# Patient Record
Sex: MALE | Race: WHITE | NOT HISPANIC OR LATINO | Employment: OTHER | URBAN - METROPOLITAN AREA
[De-identification: names, ages, dates, MRNs, and addresses within clinical notes are randomized per-mention and may not be internally consistent; named-entity substitution may affect disease eponyms.]

---

## 2017-11-14 ENCOUNTER — OFFICE VISIT (OUTPATIENT)
Dept: PRIMARY CARE CLINIC | Facility: CLINIC | Age: 57
End: 2017-11-14
Payer: COMMERCIAL

## 2017-11-14 DIAGNOSIS — N20.0 NEPHROLITHIASIS: ICD-10-CM

## 2017-11-14 DIAGNOSIS — F41.9 ANXIETY: ICD-10-CM

## 2017-11-14 DIAGNOSIS — E66.3 OVERWEIGHT: ICD-10-CM

## 2017-11-14 DIAGNOSIS — F43.10 POSTTRAUMATIC STRESS DISORDER: ICD-10-CM

## 2017-11-14 DIAGNOSIS — I10 HYPERTENSION, UNSPECIFIED TYPE: Primary | ICD-10-CM

## 2017-11-14 DIAGNOSIS — Z87.81 HISTORY OF RIB FRACTURE: ICD-10-CM

## 2017-11-14 DIAGNOSIS — E11.00 TYPE 2 DIABETES MELLITUS WITH HYPEROSMOLARITY WITHOUT COMA, WITHOUT LONG-TERM CURRENT USE OF INSULIN: ICD-10-CM

## 2017-11-14 DIAGNOSIS — F32.A DEPRESSION, UNSPECIFIED DEPRESSION TYPE: ICD-10-CM

## 2017-11-14 PROCEDURE — 99999 PR PBB SHADOW E&M-NEW PATIENT-LVL III: CPT | Mod: PBBFAC,,, | Performed by: FAMILY MEDICINE

## 2017-11-14 PROCEDURE — 99203 OFFICE O/P NEW LOW 30 MIN: CPT | Mod: S$GLB,,, | Performed by: FAMILY MEDICINE

## 2017-11-14 RX ORDER — LORAZEPAM 1 MG/1
1 TABLET ORAL 3 TIMES DAILY
COMMUNITY
Start: 2017-11-02 | End: 2022-08-30

## 2017-11-14 RX ORDER — IBUPROFEN 100 MG/5ML
2000 SUSPENSION, ORAL (FINAL DOSE FORM) ORAL DAILY
COMMUNITY

## 2017-11-14 RX ORDER — MULTIVITAMIN
1 TABLET ORAL DAILY
COMMUNITY

## 2017-11-14 RX ORDER — TRAZODONE HYDROCHLORIDE 150 MG/1
150 TABLET ORAL NIGHTLY
COMMUNITY
Start: 2017-09-18 | End: 2020-05-28 | Stop reason: SDUPTHER

## 2017-11-14 RX ORDER — AMLODIPINE BESYLATE 5 MG/1
5 TABLET ORAL DAILY
Qty: 30 TABLET | Refills: 11 | Status: SHIPPED | OUTPATIENT
Start: 2017-11-14 | End: 2017-11-22 | Stop reason: SDUPTHER

## 2017-11-14 RX ORDER — AMOXICILLIN 500 MG
500 CAPSULE ORAL DAILY
COMMUNITY
End: 2020-05-28

## 2017-11-14 RX ORDER — NAPROXEN SODIUM 220 MG
440 TABLET ORAL DAILY
COMMUNITY
End: 2020-05-28

## 2017-11-14 RX ORDER — ASPIRIN 81 MG/1
81 TABLET ORAL DAILY
COMMUNITY
End: 2021-07-20

## 2017-11-14 RX ORDER — FLUOXETINE HYDROCHLORIDE 40 MG/1
40 CAPSULE ORAL DAILY
COMMUNITY
End: 2020-05-28
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
## 2017-11-14 NOTE — PROGRESS NOTES
Urine litmus testing revealed glucose 1000. Patient returned to office, current fasting . Findings discussed with Dr Allen. Ordered: metformin 500mg by mouth twice daily, refer patient to [facility] for diabetic and diet education, add hgbA1C to pending labs. Discussed with patient.
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
## 2017-11-15 ENCOUNTER — TELEPHONE (OUTPATIENT)
Dept: PRIMARY CARE CLINIC | Facility: CLINIC | Age: 57
End: 2017-11-15

## 2017-11-15 DIAGNOSIS — Z71.89 DIABETES EDUCATION, ENCOUNTER FOR: Primary | ICD-10-CM

## 2017-11-15 NOTE — TELEPHONE ENCOUNTER
----- Message from Tung Bazan sent at 11/15/2017  9:34 AM CST -----  Contact: self  Patient want to know if you can call his diabetic medication in please send to [facility], any questions please call back at [phone] (home)       [facility]  [phone]

## 2017-11-15 NOTE — TELEPHONE ENCOUNTER
Left message for patient that this matter will be discussed when he is seen for dietary education on 11/16.

## 2017-11-15 NOTE — TELEPHONE ENCOUNTER
----- Message from Brielle Vargas sent at 11/15/2017  1:11 PM CST -----  Patient requesting order for diabetic testing supplies: Glucose Meter and Testing Strips/please call back at [phone] to confirm.

## 2017-11-16 ENCOUNTER — CLINICAL SUPPORT (OUTPATIENT)
Dept: PRIMARY CARE CLINIC | Facility: CLINIC | Age: 57
End: 2017-11-16
Payer: COMMERCIAL

## 2017-11-16 DIAGNOSIS — I10 HYPERTENSION, UNSPECIFIED TYPE: ICD-10-CM

## 2017-11-16 DIAGNOSIS — E11.00 TYPE 2 DIABETES MELLITUS WITH HYPEROSMOLARITY WITHOUT COMA, WITHOUT LONG-TERM CURRENT USE OF INSULIN: ICD-10-CM

## 2017-11-16 DIAGNOSIS — E66.3 OVERWEIGHT: ICD-10-CM

## 2017-11-16 LAB
ALBUMIN SERPL BCP-MCNC: 4.1 G/DL
ALP SERPL-CCNC: 120 U/L
ALT SERPL W/O P-5'-P-CCNC: 136 U/L
ANION GAP SERPL CALC-SCNC: 10 MMOL/L
AST SERPL-CCNC: 86 U/L
BASOPHILS # BLD AUTO: 0.03 K/UL
BASOPHILS NFR BLD: 0.4 %
BILIRUB SERPL-MCNC: 0.8 MG/DL
BUN SERPL-MCNC: 22 MG/DL
CALCIUM SERPL-MCNC: 9.7 MG/DL
CHLORIDE SERPL-SCNC: 103 MMOL/L
CHOLEST SERPL-MCNC: 208 MG/DL
CHOLEST/HDLC SERPL: 6.5 {RATIO}
CO2 SERPL-SCNC: 29 MMOL/L
COMPLEXED PSA SERPL-MCNC: 2.2 NG/ML
CREAT SERPL-MCNC: 1 MG/DL
DIFFERENTIAL METHOD: ABNORMAL
EOSINOPHIL # BLD AUTO: 0.1 K/UL
EOSINOPHIL NFR BLD: 1.4 %
ERYTHROCYTE [DISTWIDTH] IN BLOOD BY AUTOMATED COUNT: 12.4 %
EST. GFR  (AFRICAN AMERICAN): >60 ML/MIN/1.73 M^2
EST. GFR  (NON AFRICAN AMERICAN): >60 ML/MIN/1.73 M^2
ESTIMATED AVG GLUCOSE: 275 MG/DL
GLUCOSE SERPL-MCNC: 246 MG/DL
HBA1C MFR BLD HPLC: 11.2 %
HCT VFR BLD AUTO: 49.4 %
HDLC SERPL-MCNC: 32 MG/DL
HDLC SERPL: 15.4 %
HGB BLD-MCNC: 16.7 G/DL
IMM GRANULOCYTES # BLD AUTO: 0.03 K/UL
IMM GRANULOCYTES NFR BLD AUTO: 0.4 %
LDLC SERPL CALC-MCNC: 112.8 MG/DL
LYMPHOCYTES # BLD AUTO: 3.1 K/UL
LYMPHOCYTES NFR BLD: 36.9 %
MCH RBC QN AUTO: 31.7 PG
MCHC RBC AUTO-ENTMCNC: 33.8 G/DL
MCV RBC AUTO: 94 FL
MONOCYTES # BLD AUTO: 0.5 K/UL
MONOCYTES NFR BLD: 5.7 %
NEUTROPHILS # BLD AUTO: 4.6 K/UL
NEUTROPHILS NFR BLD: 55.2 %
NONHDLC SERPL-MCNC: 176 MG/DL
NRBC BLD-RTO: 0 /100 WBC
PLATELET # BLD AUTO: 250 K/UL
PMV BLD AUTO: 8.5 FL
POTASSIUM SERPL-SCNC: 4.6 MMOL/L
PROT SERPL-MCNC: 7.6 G/DL
RBC # BLD AUTO: 5.26 M/UL
SODIUM SERPL-SCNC: 142 MMOL/L
T4 FREE SERPL-MCNC: 1 NG/DL
TRIGL SERPL-MCNC: 316 MG/DL
TSH SERPL DL<=0.005 MIU/L-ACNC: 0.68 UIU/ML
WBC # BLD AUTO: 8.4 K/UL

## 2017-11-16 PROCEDURE — 80061 LIPID PANEL: CPT

## 2017-11-16 PROCEDURE — 83036 HEMOGLOBIN GLYCOSYLATED A1C: CPT

## 2017-11-16 PROCEDURE — 99999 PR PBB SHADOW E&M-EST. PATIENT-LVL II: CPT | Mod: PBBFAC,,,

## 2017-11-16 PROCEDURE — 84153 ASSAY OF PSA TOTAL: CPT

## 2017-11-16 PROCEDURE — 84439 ASSAY OF FREE THYROXINE: CPT

## 2017-11-16 PROCEDURE — 84443 ASSAY THYROID STIM HORMONE: CPT

## 2017-11-16 PROCEDURE — 80053 COMPREHEN METABOLIC PANEL: CPT

## 2017-11-16 PROCEDURE — 85025 COMPLETE CBC W/AUTO DIFF WBC: CPT

## 2017-11-17 ENCOUNTER — TELEPHONE (OUTPATIENT)
Dept: PRIMARY CARE CLINIC | Facility: CLINIC | Age: 57
End: 2017-11-17

## 2017-11-17 NOTE — TELEPHONE ENCOUNTER
----- Message from Mary Anne Rose sent at 11/17/2017  8:17 AM CST -----  Contact: self  Patient called requesting a medication refill. Please see details below.  Medication Name:lantus, test strips and glucometer   Medication Strength:  [facility]erred pharmacy[facility]  First time calling about this refill (y/n):y  Call Back Number:[phone]  Patient states [employer] sent a fax with the correct meter. Patient is out of test strips.

## 2017-11-22 RX ORDER — METFORMIN HYDROCHLORIDE 500 MG/1
TABLET ORAL
Refills: 5 | COMMUNITY
Start: 2017-11-15 | End: 2017-11-22 | Stop reason: SDUPTHER

## 2017-11-23 RX ORDER — METFORMIN HYDROCHLORIDE 500 MG/1
500 TABLET ORAL
Qty: 90 TABLET | Refills: 1 | Status: SHIPPED | OUTPATIENT
Start: 2017-11-23 | End: 2018-01-16 | Stop reason: SDUPTHER

## 2017-11-23 RX ORDER — AMLODIPINE BESYLATE 5 MG/1
5 TABLET ORAL DAILY
Qty: 90 TABLET | Refills: 1 | OUTPATIENT
Start: 2017-11-23 | End: 2018-01-16 | Stop reason: SDUPTHER

## 2017-12-03 PROBLEM — R79.89 ELEVATED LFTS: Status: ACTIVE | Noted: 2017-12-03

## 2017-12-03 PROBLEM — E78.5 HYPERLIPIDEMIA: Status: ACTIVE | Noted: 2017-12-03

## 2017-12-04 ENCOUNTER — OFFICE VISIT (OUTPATIENT)
Dept: PODIATRY | Facility: CLINIC | Age: 57
End: 2017-12-04
Payer: COMMERCIAL

## 2017-12-04 DIAGNOSIS — E11.9 ENCOUNTER FOR DIABETIC FOOT EXAM: Primary | ICD-10-CM

## 2017-12-04 PROCEDURE — 99203 OFFICE O/P NEW LOW 30 MIN: CPT | Mod: S$GLB,,, | Performed by: PODIATRIST

## 2017-12-04 PROCEDURE — 99999 PR PBB SHADOW E&M-EST. PATIENT-LVL II: CPT | Mod: PBBFAC,,, | Performed by: PODIATRIST

## 2017-12-04 RX ORDER — BLOOD-GLUCOSE METER
EACH MISCELLANEOUS
Refills: 0 | COMMUNITY
Start: 2017-11-18 | End: 2017-12-12 | Stop reason: SDUPTHER

## 2017-12-04 NOTE — PROGRESS NOTES
CC:     Foot exam       HPI:   The patient is a 57 y.o.  male  who presents for a diabetic foot exam.     Patient reports no presence of abnormal sensation to the feet .    History of diabetic foot ulcers: none   History of foot surgery: none.     Shoes worn today:  Casual slip on shoes.   Has tender ingrown nails.  Gets pedicures.        Primary care doctor is: Dorian Allen MD  Chief Complaint   Patient presents with    PCP     Alejandro    Foot Pain          No past medical history on file.        Current Outpatient Prescriptions on File Prior to Visit   Medication Sig Dispense Refill    amLODIPine (NORVASC) 5 MG tablet Take 1 tablet (5 mg total) by mouth once daily. 90 tablet 1    ascorbic acid, vitamin C, (VITAMIN C) 1000 MG tablet Take 2,000 mg by mouth once daily.      aspirin (ECOTRIN) 81 MG EC tablet Take 81 mg by mouth once daily.      fish oil-omega-3 fatty acids 300-1,000 mg capsule Take 500 mg by mouth once daily.      FLUoxetine (PROZAC) 40 MG capsule Take 40 mg by mouth once daily.      LORazepam (ATIVAN) 1 MG tablet Take 1 mg by mouth 3 (three) times daily.      metFORMIN (GLUCOPHAGE) 500 MG tablet Take 1 tablet (500 mg total) by mouth daily with breakfast. 90 tablet 1    multivitamin (ONE DAILY MULTIVITAMIN) per tablet Take 1 tablet by mouth once daily.      naproxen sodium (ANAPROX) 220 MG tablet Take 440 mg by mouth once daily.      traZODone (DESYREL) 150 MG tablet Take 75 mg by mouth every evening.       No current facility-administered medications on file prior to visit.            Review of patient's allergies indicates:  No Known Allergies        Social History     Social History    Marital status:      Spouse name: N/A    Number of children: N/A    Years of education: N/A     Occupational History    Not on file.     Social History Main Topics    Smoking status: Former Smoker     Types: Cigarettes    Smokeless tobacco: Never Used    Alcohol use 1.2 oz/week     1  Shots of liquor, 1 Cans of beer per week    Drug use: No    Sexual activity: Yes     Partners: Male     Other Topics Concern    Not on file     Social History Narrative    No narrative on file           ROS:  General ROS: negative  Respiratory ROS: no cough, shortness of breath, or wheezing  Cardiovascular ROS: no chest pain or dyspnea on exertion  Musculoskeletal ROS: negative  Neurological ROS:   negative for - impaired coordination/balance or numbness/tingling  Dermatological ROS: negative      LAST HbA1c:   Hemoglobin A1C   Date Value Ref Range Status   11/16/2017 11.2 (H) 4.0 - 5.6 % Final     Comment:     According to ADA guidelines, hemoglobin A1c <7.0% represents  optimal control in non-pregnant diabetic patients. Different  metrics may apply to specific patient populations.   Standards of Medical Care in Diabetes-2016.  For the purpose of screening for the presence of diabetes:  <5.7%     Consistent with the absence of diabetes  5.7-6.4%  Consistent with increasing risk for diabetes   (prediabetes)  >or=6.5%  Consistent with diabetes  Currently, no consensus exists for use of hemoglobin A1c  for diagnosis of diabetes for children.  This Hemoglobin A1c assay has significant interference with fetal   hemoglobin   (HbF). The results are invalid for patients with abnormal amounts of   HbF,   including those with known Hereditary Persistence   of Fetal Hemoglobin. Heterozygous hemoglobin variants (HbAS, HbAC,   HbAD, HbAE, HbA2) do not significantly interfere with this assay;   however, presence of multiple variants in a sample may impact the %   interference.             EXAM:   There were no vitals filed for this visit.    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   Ulceration -  Neither, Blister -   Neither, Skin Breakdown -  Neither and Callus -  Neither  Thickened bilateral hallux toenails, yellow with slightly incurvated borders without paronychia or infection.     Pedal Pulses:   Right: Present  Left: Present    Posterior tibialis pulses:   Right:Present  Left: Present     No LOPS  5/5 muscle strength.                      ASSESSMENT/PLAN:      I counseled the patient on his conditions, their implications and medical management.       Encounter for diabetic foot exam          Shoe inspection. Diabetic Foot Education. Patient reminded of the importance of good nutrition and blood sugar control to help prevent podiatric complications of diabetes. Patient instructed on proper foot hygiene. We discussed wearing proper shoe gear, daily foot inspections, never walking without protective shoe gear, never putting sharp instruments to feet.        Return in about 1 year (around 12/4/2018) for diabetic foot exam, or sooner if concerned.             Sallie Garg DPM  NPI: [id number]

## 2017-12-04 NOTE — PATIENT INSTRUCTIONS
Your a1c    Hemoglobin A1C   Date Value Ref Range Status   11/16/2017 11.2 (H) 4.0 - 5.6 % Final     Comment:     According to ADA guidelines, hemoglobin A1c <7.0% represents  optimal control in non-pregnant diabetic patients. Different  metrics may apply to specific patient populations.   Standards of Medical Care in Diabetes-2016.  For the purpose of screening for the presence of diabetes:  <5.7%     Consistent with the absence of diabetes  5.7-6.4%  Consistent with increasing risk for diabetes   (prediabetes)  >or=6.5%  Consistent with diabetes  Currently, no consensus exists for use of hemoglobin A1c  for diagnosis of diabetes for children.  This Hemoglobin A1c assay has significant interference with fetal   hemoglobin   (HbF). The results are invalid for patients with abnormal amounts of   HbF,   including those with known Hereditary Persistence   of Fetal Hemoglobin. Heterozygous hemoglobin variants (HbAS, HbAC,   HbAD, HbAE, HbA2) do not significantly interfere with this assay;   however, presence of multiple variants in a sample may impact the %   interference.         How to Check Your Feet    Below are tips to help you look for foot problems. Try to check your feet at the same time each day, such as when you get out of bed in the morning.    · Check the top of each foot. The tops of toes, back of the heel, and outer edge of the foot can get a lot of rubbing from poor-fitting shoes.    · Check the bottom of each foot. Daily wear and tear often leads to problems at pressure spots.    · Check the toes and nails. Fungal infections often occur between toes. Toenail problems can also be a sign of fungal infections or lead to breaks in the skin.    · Check your shoes, too. Loose objects inside a shoe can injure the foot. Use your hand to feel inside your shoes for things like daryl, loose stitching, or rough areas that could irritate your skin.        Diabetic Foot Care    Diabetes can lead to a number of  different foot complications. Fortunately, most of these complications can be prevented with a little extra foot care. If diabetes is not well controlled, the high blood sugar can cause damage to blood vessels and result in poor circulation to the foot. When the skin does not get enough blood flow, it becomes prone to pressure sores and ulcers, which heal slowly.  High blood sugar can also damage nerves, interfering with the ability to feel pain and pressure. When you cant feel your foot normally, it is easy to injure your skin, bones and joints without knowing it. For these reasons diabetes increases the risk of fungal infections, bunions and ulcers. Deep ulcers can lead to bone infection. Gangrene is the most serious foot complication of diabetes. It usually occurs on the tips of the toes as blacked areas of skin. The black area is dead tissue. In severe cases, gangrene spreads to involve the entire toe, other toes and the entire foot. Foot or toe amputation may be required. Good foot care and blood sugar control can prevent this.    Home Care  1. Wear comfortable, proper fitting shoes.  2. Wash your feet daily with warm water and mild soap.  3. After drying, apply a moisturizing cream or lotion.  4. Check your feet daily for skin breaks, blisters, swelling, or redness. Look between your toes also.  5. Wear cotton socks and change them every day.  6. Trim toe nails carefully and do not cut your cuticles.  7. Strive to keep your blood sugar under control with a combination of medicines, diet and activity.  8. If you smoke and have diabetes, it is very important that you stop. Smoking reduces blood flow to your foot.  9. Avoid activities that increase your risk of foot injury:  · Do not walk barefoot.  · Do not use heating pads or hot water bottles on your feet.  · Do not put your foot in a hot tub without first checking the temperature with your hand.  10) Schedule yearly foot exams.    Follow Up  with your doctor  or as advised by our staff. Report any cut, puncture, scrape, other injury, blister, ingrown toenail or ulcer on your foot.    Get Prompt Medical Attention  if any of the following occur:  -- Open ulcer with pus draining from the wound  -- Increasing foot or leg pain  -- New areas of redness or swelling or tender areas of the foot    © 8203-0411 [employer]. [address] [place] [address]. All rights reserved. This information is not intended as a substitute for professional medical care. Always follow your healthcare professional's instructions.

## 2017-12-06 ENCOUNTER — TELEPHONE (OUTPATIENT)
Dept: PRIMARY CARE CLINIC | Facility: CLINIC | Age: 57
End: 2017-12-06

## 2017-12-06 ENCOUNTER — OFFICE VISIT (OUTPATIENT)
Dept: OPTOMETRY | Facility: CLINIC | Age: 57
End: 2017-12-06
Payer: COMMERCIAL

## 2017-12-06 DIAGNOSIS — R79.89 ELEVATED LFTS: Primary | ICD-10-CM

## 2017-12-06 DIAGNOSIS — E11.9 DIABETES MELLITUS TYPE 2 WITHOUT RETINOPATHY: Primary | ICD-10-CM

## 2017-12-06 DIAGNOSIS — H25.13 NUCLEAR SCLEROSIS, BILATERAL: ICD-10-CM

## 2017-12-06 PROCEDURE — 99999 PR PBB SHADOW E&M-EST. PATIENT-LVL II: CPT | Mod: PBBFAC,,, | Performed by: OPTOMETRIST

## 2017-12-06 PROCEDURE — 92004 COMPRE OPH EXAM NEW PT 1/>: CPT | Mod: S$GLB,,, | Performed by: OPTOMETRIST

## 2017-12-06 NOTE — PROGRESS NOTES
HPI      is here for DFE, . Pt sts blurry vision intermediate   Declines refraction  Blood Sugar-147 am / 107 PM  GALEN/ DFE 02/2017  Hemoglobin A1C       Date                     Value               Ref Range             Status                11/16/2017               11.2 (H)            4.0 - 5.6 %           Final            (-)Flashes (-)Floaters  (-)Itch, (-)tear, (--)burn, (-)Dryness. (-) OTC Drops   (-)Photophobia  (-)Glare (-)diplopia (-) headaches          Last edited by Ted Pantoja, OD on 12/6/2017  3:06 PM. (History)            Assessment /Plan     For exam results, see Encounter Report.    Diabetes mellitus type 2 without retinopathy  -No retinopathy noted today.  Continued control with primary care physician and annual comprehensive eye exam.    Nuclear sclerosis, bilateral  -Educated patient on presence of cataracts at today's exam, monitor at annual dilated fundus exam. 8+ years surgical estimate.      RTC 1 yr

## 2017-12-06 NOTE — TELEPHONE ENCOUNTER
----- Message from Missy Coley sent at 12/6/2017  3:24 PM CST -----  Contact: self   Patient states he missed a call and voicemail states they were calling about test results     Please call back [phone]

## 2017-12-12 ENCOUNTER — OFFICE VISIT (OUTPATIENT)
Dept: PRIMARY CARE CLINIC | Facility: CLINIC | Age: 57
End: 2017-12-12
Payer: COMMERCIAL

## 2017-12-12 VITALS
RESPIRATION RATE: 18 BRPM | HEART RATE: 70 BPM | BODY MASS INDEX: 32.64 KG/M2 | HEIGHT: 72 IN | TEMPERATURE: 98 F | SYSTOLIC BLOOD PRESSURE: 144 MMHG | WEIGHT: 241 LBS | DIASTOLIC BLOOD PRESSURE: 82 MMHG | OXYGEN SATURATION: 92 %

## 2017-12-12 DIAGNOSIS — F41.9 ANXIETY: ICD-10-CM

## 2017-12-12 DIAGNOSIS — E11.9 TYPE 2 DIABETES MELLITUS WITHOUT COMPLICATION, WITHOUT LONG-TERM CURRENT USE OF INSULIN: ICD-10-CM

## 2017-12-12 DIAGNOSIS — F32.A DEPRESSION, UNSPECIFIED DEPRESSION TYPE: ICD-10-CM

## 2017-12-12 DIAGNOSIS — R79.89 ELEVATED LFTS: ICD-10-CM

## 2017-12-12 DIAGNOSIS — E78.5 HYPERLIPIDEMIA, UNSPECIFIED HYPERLIPIDEMIA TYPE: ICD-10-CM

## 2017-12-12 DIAGNOSIS — I10 HYPERTENSION, UNSPECIFIED TYPE: Primary | ICD-10-CM

## 2017-12-12 PROCEDURE — 99213 OFFICE O/P EST LOW 20 MIN: CPT | Mod: S$GLB,,, | Performed by: FAMILY MEDICINE

## 2017-12-12 PROCEDURE — 99999 PR PBB SHADOW E&M-EST. PATIENT-LVL IV: CPT | Mod: PBBFAC,,, | Performed by: FAMILY MEDICINE

## 2017-12-12 RX ORDER — BLOOD-GLUCOSE METER
1 EACH MISCELLANEOUS 3 TIMES DAILY
Qty: 150 STRIP | Refills: 11 | Status: SHIPPED | OUTPATIENT
Start: 2017-12-12 | End: 2017-12-18 | Stop reason: CLARIF

## 2017-12-12 RX ORDER — ASCORBIC ACID 1000 MG
1000 TABLET ORAL 2 TIMES DAILY
COMMUNITY
End: 2020-05-28

## 2017-12-12 NOTE — PROGRESS NOTES
Subjective:       Patient ID: Everette Hodge Jr. is a 57 y.o. male.    Chief Complaint: Results    HPI: 57-year-old white male has been exercising daily and change his diet .lost from size 38 to 36--carotids 3-1/2 miles in the morning and afternoon and walks 2 miles .does have some problems with the [place] diet with diabetic .  Needs abdominal ultrasound Low 100 [id number]   ROS:  Skin: no psoriasis, eczema, skin cancer  HEENT: No headache, ocular pain, blurred vision, diplopia, epistaxis, hoarseness change in voice, thyroid trouble  Lung: No pneumonia, asthma, Tb, wheezing, SOB,  Heart: No chest pain, ankle edema, palpitations, MI, marco antonio murmur, +hypertension,+ hyperlipidemia--November 30 124/81 at home  Abdomen: No nausea, vomiting, diarrhea, constipation, ulcers, hepatitis, gallbladder disease, melena, hematochezia, hematemesis increased LFT  : no UTI, renal disease,+tones  MS: no fractures, O/A, lupus, rheumatoid, gout  Neuro: No dizziness, LOC, seizures   No diabetes, no anemia, + anxiety, + depression     Objective:   Physical Exam:  General: Well nourished, well developed, no acute distress  Skin: No lesions  HEENT: Eyes PERRLA, EOM intact, nose patent, throat non-erythematous   NECK: Supple, no bruits, No JVD, no nodes  Lungs: Clear, no rales, rhonchi, wheezing  Heart: Regular rate and rhythm, no murmurs, gallops, or rubs  Abdomen: flat, bowel sounds positive, no tenderness, or organomegaly  MS: Range of motion and muscle strength intact  Neuro: Alert, CN intact, oriented X 3  Extremities: No cyanosis, clubbing, or edema         Assessment:       1. Hypertension, unspecified type    2. Hyperlipidemia, unspecified hyperlipidemia type    3. Type 2 diabetes mellitus without complication, without long-term current use of insulin    4. Elevated LFTs    5. Anxiety    6. Depression, unspecified depression type        Plan:       Hypertension, unspecified type  -     CBC auto differential; Future; Expected date:  12/12/2017  -     Comprehensive metabolic panel; Future; Expected date: 12/12/2017    Hyperlipidemia, unspecified hyperlipidemia type  -     Lipid panel; Future; Expected date: 12/12/2017    Type 2 diabetes mellitus without complication, without long-term current use of insulin  -     Hemoglobin A1c; Future; Expected date: 12/12/2017    Elevated LFTs  -     US Abdomen Complete; Future; Expected date: 12/12/2017  -     HEPATITIS PANEL, ACUTE; Future; Expected date: 12/12/2017    Anxiety    Depression, unspecified depression type    Other orders  -     ONETOUCH VERIO Strp; 1 strip by Subdermal route 3 (three) times daily.  Dispense: 150 strip; Refill: 11      patient to redo CBC CMP lipid hemoglobin A1c in 3 months hemoglobin A1c was 11.2,  Abdominal ultrasound to evaluate liver increased liver function tests and hepatitis panel for hepatitis A, hepatitis B, hepatitis C  Patient doing extremely well with diet and exercise is lost weight and improved his numbers told to continue what he is doing will recheck 2 weeks after lab done

## 2017-12-15 ENCOUNTER — TELEPHONE (OUTPATIENT)
Dept: PRIMARY CARE CLINIC | Facility: CLINIC | Age: 57
End: 2017-12-15

## 2017-12-15 NOTE — TELEPHONE ENCOUNTER
----- Message from Jose Osman sent at 12/12/2017 11:28 AM CST -----  Contact: Patient/Self  Patient stopped by, states test strips were called in but to get them filled it needs a new CMA with the difference from 100 strips to 150 strips     ThanksJose

## 2017-12-15 NOTE — TELEPHONE ENCOUNTER
Spoke with pharmacy, RX was received for patient, a CMA form needs to be faxed to Medicare for strips to be covered.

## 2017-12-18 NOTE — TELEPHONE ENCOUNTER
Pt came into clinic, states insurance will only cover true metrix glucometer, lancents, and strips.    Please sign order for kit.

## 2017-12-19 ENCOUNTER — PATIENT MESSAGE (OUTPATIENT)
Dept: PRIMARY CARE CLINIC | Facility: CLINIC | Age: 57
End: 2017-12-19

## 2017-12-22 ENCOUNTER — PATIENT MESSAGE (OUTPATIENT)
Dept: PRIMARY CARE CLINIC | Facility: CLINIC | Age: 57
End: 2017-12-22

## 2017-12-24 RX ORDER — DEXTROSE 4 G
1 TABLET,CHEWABLE ORAL ONCE
Qty: 1 EACH | Refills: 0 | Status: SHIPPED | OUTPATIENT
Start: 2017-12-24 | End: 2019-03-20

## 2017-12-24 RX ORDER — LANCETS 33 GAUGE
1 EACH MISCELLANEOUS 3 TIMES DAILY
Qty: 200 EACH | Refills: 11 | Status: SHIPPED | OUTPATIENT
Start: 2017-12-24 | End: 2019-03-20

## 2018-01-16 NOTE — TELEPHONE ENCOUNTER
----- Message from Neris Fajardo sent at 1/16/2018 11:11 AM CST -----  Refill on Rx Amlodipine 5 mg tab and Rx Metformin 500 mg tab (90 day supply).  Please send into [facility]/[phone].

## 2018-01-17 RX ORDER — METFORMIN HYDROCHLORIDE 500 MG/1
500 TABLET ORAL
Qty: 90 TABLET | Refills: 1 | Status: SHIPPED | OUTPATIENT
Start: 2018-01-17 | End: 2018-01-26 | Stop reason: SDUPTHER

## 2018-01-17 RX ORDER — AMLODIPINE BESYLATE 5 MG/1
5 TABLET ORAL DAILY
Qty: 90 TABLET | Refills: 1 | Status: SHIPPED | OUTPATIENT
Start: 2018-01-17 | End: 2018-01-26 | Stop reason: SDUPTHER

## 2018-01-23 RX ORDER — AMLODIPINE BESYLATE 5 MG/1
5 TABLET ORAL DAILY
Qty: 90 TABLET | Refills: 1 | Status: CANCELLED | OUTPATIENT
Start: 2018-01-23 | End: 2019-01-23

## 2018-01-23 RX ORDER — METFORMIN HYDROCHLORIDE 500 MG/1
500 TABLET ORAL
Qty: 90 TABLET | Refills: 1 | Status: CANCELLED | OUTPATIENT
Start: 2018-01-23

## 2018-01-26 NOTE — TELEPHONE ENCOUNTER
----- Message from Azul Blankenship sent at 1/25/2018 11:44 AM CST -----  Contact: Nina with [facility]  They are calling to follow up on a prescription refill request on amLODIPine (NORVASC) 5 MG tablet and metFORMIN (GLUCOPHAGE) 500 MG tablet which was sent over on 1/23.  Call Back#[phone]  Fax#[phone]  Thanks

## 2018-01-29 RX ORDER — AMLODIPINE BESYLATE 5 MG/1
5 TABLET ORAL DAILY
Qty: 90 TABLET | Refills: 1 | Status: SHIPPED | OUTPATIENT
Start: 2018-01-29 | End: 2018-01-30 | Stop reason: SDUPTHER

## 2018-01-29 RX ORDER — METFORMIN HYDROCHLORIDE 500 MG/1
500 TABLET ORAL
Qty: 90 TABLET | Refills: 1 | Status: SHIPPED | OUTPATIENT
Start: 2018-01-29 | End: 2018-01-30 | Stop reason: SDUPTHER

## 2018-01-30 ENCOUNTER — TELEPHONE (OUTPATIENT)
Dept: PRIMARY CARE CLINIC | Facility: CLINIC | Age: 58
End: 2018-01-30

## 2018-01-30 NOTE — TELEPHONE ENCOUNTER
Patient came in yesterday (1/29/18) with complaint that he has been trying to have his amlodipine and metformin sent to [facility] for months and it is still not happening. Nurse requests a few days to take care of and Mr Hodge agreed. Nurse spoke with provider and both scrips sent electronically to [facility] and patient will be scheduled for home delivery with 90day supply and one refill. Nurse called Mr Hodge this am (1/30/18) and advised of above and nurse also suggested the he be allowed to send 30 day supply of each of those medications to local pharmacy for  so that patient does not run out. Mr Hodge agreed. Nurse than called [facility] pharmacy and spoke with Agustin and gave refill request for medications for 30 day supply with no refills.

## 2018-02-01 ENCOUNTER — TELEPHONE (OUTPATIENT)
Dept: PRIMARY CARE CLINIC | Facility: CLINIC | Age: 58
End: 2018-02-01

## 2018-02-01 NOTE — TELEPHONE ENCOUNTER
Nurse returned Mr Hodge call regarding [facility] not getting his scrips fo amlodipine and metformin. RN called [facility] and spoke with pharmacist Cheko and he informed nurse that both scrips are being processed and will take about 10 days to ship. Nurse called patient and repeated information, patient was very appreciative and thanked nurse.

## 2018-02-02 RX ORDER — AMLODIPINE BESYLATE 5 MG/1
TABLET ORAL
Qty: 90 TABLET | Refills: 1 | Status: SHIPPED | OUTPATIENT
Start: 2018-02-02 | End: 2018-12-17 | Stop reason: SDUPTHER

## 2018-02-02 RX ORDER — METFORMIN HYDROCHLORIDE 500 MG/1
TABLET ORAL
Qty: 90 TABLET | Refills: 1 | Status: SHIPPED | OUTPATIENT
Start: 2018-02-02 | End: 2018-02-13

## 2018-02-02 NOTE — TELEPHONE ENCOUNTER
Call tell pt needs lab 1q 6 mo with DM needs CBC,CMP,lipid HGBA1c in 6 mo see me 6 mo later for refills, results

## 2018-02-06 ENCOUNTER — TELEPHONE (OUTPATIENT)
Dept: PRIMARY CARE CLINIC | Facility: CLINIC | Age: 58
End: 2018-02-06

## 2018-02-06 NOTE — TELEPHONE ENCOUNTER
Patient called stating he got is [facility]rip [facility] for medformin 500 mg but quantity was only for 90 pills with instructions to take once a day. Patient states prior scrip was for 90 days with instructions to take bid. Message sent to Dr Allen for clarification .

## 2018-02-09 ENCOUNTER — TELEPHONE (OUTPATIENT)
Dept: PRIMARY CARE CLINIC | Facility: CLINIC | Age: 58
End: 2018-02-09

## 2018-02-13 ENCOUNTER — PATIENT MESSAGE (OUTPATIENT)
Dept: PRIMARY CARE CLINIC | Facility: CLINIC | Age: 58
End: 2018-02-13

## 2018-02-13 RX ORDER — METFORMIN HYDROCHLORIDE 500 MG/1
500 TABLET ORAL 2 TIMES DAILY WITH MEALS
Qty: 180 TABLET | Refills: 1 | Status: SHIPPED | OUTPATIENT
Start: 2018-02-13 | End: 2019-03-20

## 2018-02-19 ENCOUNTER — OFFICE VISIT (OUTPATIENT)
Dept: PODIATRY | Facility: CLINIC | Age: 58
End: 2018-02-19
Payer: COMMERCIAL

## 2018-02-19 VITALS
HEART RATE: 69 BPM | DIASTOLIC BLOOD PRESSURE: 85 MMHG | SYSTOLIC BLOOD PRESSURE: 127 MMHG | HEIGHT: 72 IN | WEIGHT: 230 LBS | BODY MASS INDEX: 31.15 KG/M2

## 2018-02-19 DIAGNOSIS — M79.675 PAIN IN TOES OF BOTH FEET: Primary | ICD-10-CM

## 2018-02-19 DIAGNOSIS — L60.0 INGROWN NAIL: ICD-10-CM

## 2018-02-19 DIAGNOSIS — M79.674 PAIN IN TOES OF BOTH FEET: Primary | ICD-10-CM

## 2018-02-19 PROCEDURE — 99499 UNLISTED E&M SERVICE: CPT | Mod: S$GLB,,, | Performed by: PODIATRIST

## 2018-02-19 PROCEDURE — 99999 PR PBB SHADOW E&M-EST. PATIENT-LVL III: CPT | Mod: PBBFAC,,, | Performed by: PODIATRIST

## 2018-02-19 PROCEDURE — 11750 EXCISION NAIL&NAIL MATRIX: CPT | Mod: T5,S$GLB,, | Performed by: PODIATRIST

## 2018-02-19 PROCEDURE — 11750 EXCISION NAIL&NAIL MATRIX: CPT | Mod: 51,TA,S$GLB, | Performed by: PODIATRIST

## 2018-02-19 NOTE — PROGRESS NOTES
Chief Complaint   Patient presents with    Foot Problem    Nail Problem    Ingrown Toenail     bilateral    Diabetes Mellitus            HPI:   Patient is a 58 y.o. male with complaints of painful toenail on the bilateral hallux.  The pain started on and off for a while now.  Toenails are thick and incurvated.  Patient denies acute trauma to the toe.    Home treatment:  clipping    Past Medical History:   Diagnosis Date    Cataract     Diabetes mellitus        Current Outpatient Prescriptions on File Prior to Visit   Medication Sig Dispense Refill    amLODIPine (NORVASC) 5 MG tablet TAKE 1 TABLET BY MOUTH EVERY DAY 90 tablet 1    ascorbic acid, vitamin C, (VITAMIN C) 1000 MG tablet Take 2,000 mg by mouth once daily.      aspirin (ECOTRIN) 81 MG EC tablet Take 81 mg by mouth once daily.      blood sugar diagnostic (TRUE METRIX GLUCOSE TEST STRIP) Strp 1 strip by Misc.(Non-Drug; Combo Route) route 3 (three) times daily. 200 strip 11    fish oil-omega-3 fatty acids 300-1,000 mg capsule Take 500 mg by mouth once daily.      FLUoxetine (PROZAC) 40 MG capsule Take 40 mg by mouth once daily.      lancets 33 gauge Misc 1 lancet by Misc.(Non-Drug; Combo Route) route 3 (three) times daily. 200 each 11    LORazepam (ATIVAN) 1 MG tablet Take 1 mg by mouth 3 (three) times daily.      metFORMIN (GLUCOPHAGE) 500 MG tablet Take 1 tablet (500 mg total) by mouth 2 (two) times daily with meals. 180 tablet 1    milk thistle 175 mg tablet Take 175 mg by mouth once daily.      multivitamin (ONE DAILY MULTIVITAMIN) per tablet Take 1 tablet by mouth once daily.      multivitamin with minerals tablet       naproxen sodium (ANAPROX) 220 MG tablet Take 440 mg by mouth once daily.      traZODone (DESYREL) 150 MG tablet Take 75 mg by mouth every evening.      blood-glucose meter (TRUE METRIX GLUCOSE METER) Misc 1 kit by Misc.(Non-Drug; Combo Route) route once. 1 each 0     No current facility-administered medications on  file prior to visit.         ALLG:  Review of patient's allergies indicates:  No Known Allergies        Social History     Social History    Marital status:      Spouse name: N/A    Number of children: N/A    Years of education: N/A     Occupational History    Not on file.     Social History Main Topics    Smoking status: Former Smoker     Types: Cigarettes    Smokeless tobacco: Never Used    Alcohol use 1.2 oz/week     1 Cans of beer, 1 Shots of liquor per week    Drug use: No    Sexual activity: Yes     Partners: Male     Other Topics Concern    Not on file     Social History Narrative    No narrative on file           ROS:  General ROS: negative for chills, fatigue or fever  Cardiovascular ROS: no chest pain or dyspnea on exertion  Musculoskeletal ROS: negative for - joint pain or joint stiffness.  Negative for loss of strength  Neuro ROS: Negative for syncope, numbness, or muscle weakness  Skin ROS: Negative for rash, itching or hair changes.  +Toenail changes      EXAM:   Vitals:    02/19/18 0815   BP: 127/85   Pulse: 69   Weight: 104.3 kg (230 lb)   Height: 6' (1.829 m)       Bilateral LOWER EXTREMITY EXAM:    Vascular:  Dorsalis pedis and posterior tibial pulses are palpable. capillary refill time is within normal limits and toes are warm to touch.  There is  presence of digital hair.  There is  no localized edema to the affected area.     Neurological:  Light touch, proprioception, and sharp/dull sensation are all intact.     Dermatological:  The toenail is incurvated, bilateral border of the Bilateral hallux.    no erythema noted to the affected area.     Absent paronychia.     Absent abscess    Musculoskeletal:  Muscle strength is 5/5 in all groups .         ASSESSMENT/PLAN     I counseled the patient on the patient's  conditions, their implications and medical management.       Pain in toes of both feet    Ingrown nail           Nail Removal  Date/Time: 2/19/2018 9:05 AM  Performed by:  TANNER GOINS  Authorized by: TANNER GOINS     Consent Done?:  Yes (Verbal)    Location:  Left foot (bilateral hallux)  Anesthesia:  Local infiltration  Local anesthetic: lidocaine 1% without epinephrine and bupivacaine 0.5% without epinephrine  Anesthetic total (ml):  5  Preparation:  Skin prepped with Betadine    Amount removed:  Partial  Nail removed location: bilateral borders.  Wedge excision of skin of nail fold: No    Nail bed sutured?: No    Nail matrix removed:  Partial (phenol was used)  Dressing applied:  Antibiotic ointment and dressing applied  Patient tolerance:  Patient tolerated the procedure well with no immediate complications          Verbal and written post operative instructions were provided to the patient.     Patient to monitor for any adverse reactions and follow up in 10-14 days post op

## 2018-03-01 ENCOUNTER — CLINICAL SUPPORT (OUTPATIENT)
Dept: PRIMARY CARE CLINIC | Facility: CLINIC | Age: 58
End: 2018-03-01
Payer: COMMERCIAL

## 2018-03-01 DIAGNOSIS — E78.5 HYPERLIPIDEMIA, UNSPECIFIED HYPERLIPIDEMIA TYPE: ICD-10-CM

## 2018-03-01 DIAGNOSIS — R79.89 ELEVATED LFTS: ICD-10-CM

## 2018-03-01 DIAGNOSIS — E11.9 TYPE 2 DIABETES MELLITUS WITHOUT COMPLICATION, WITHOUT LONG-TERM CURRENT USE OF INSULIN: ICD-10-CM

## 2018-03-01 DIAGNOSIS — I10 HYPERTENSION, UNSPECIFIED TYPE: ICD-10-CM

## 2018-03-01 LAB
BASOPHILS # BLD AUTO: 0.03 K/UL
BASOPHILS NFR BLD: 0.4 %
DIFFERENTIAL METHOD: ABNORMAL
EOSINOPHIL # BLD AUTO: 0.1 K/UL
EOSINOPHIL NFR BLD: 1.2 %
ERYTHROCYTE [DISTWIDTH] IN BLOOD BY AUTOMATED COUNT: 12.3 %
HCT VFR BLD AUTO: 49.1 %
HGB BLD-MCNC: 16.4 G/DL
IMM GRANULOCYTES # BLD AUTO: 0.02 K/UL
IMM GRANULOCYTES NFR BLD AUTO: 0.2 %
LYMPHOCYTES # BLD AUTO: 2.9 K/UL
LYMPHOCYTES NFR BLD: 36.1 %
MCH RBC QN AUTO: 31.2 PG
MCHC RBC AUTO-ENTMCNC: 33.4 G/DL
MCV RBC AUTO: 93 FL
MONOCYTES # BLD AUTO: 0.5 K/UL
MONOCYTES NFR BLD: 6.5 %
NEUTROPHILS # BLD AUTO: 4.5 K/UL
NEUTROPHILS NFR BLD: 55.6 %
NRBC BLD-RTO: 0 /100 WBC
PLATELET # BLD AUTO: 269 K/UL
PMV BLD AUTO: 8.5 FL
RBC # BLD AUTO: 5.26 M/UL
WBC # BLD AUTO: 8.03 K/UL

## 2018-03-01 PROCEDURE — 83036 HEMOGLOBIN GLYCOSYLATED A1C: CPT

## 2018-03-01 PROCEDURE — 80061 LIPID PANEL: CPT

## 2018-03-01 PROCEDURE — 80053 COMPREHEN METABOLIC PANEL: CPT

## 2018-03-01 PROCEDURE — 80074 ACUTE HEPATITIS PANEL: CPT

## 2018-03-01 PROCEDURE — 85025 COMPLETE CBC W/AUTO DIFF WBC: CPT

## 2018-03-02 LAB
ALBUMIN SERPL BCP-MCNC: 4.2 G/DL
ALP SERPL-CCNC: 79 U/L
ALT SERPL W/O P-5'-P-CCNC: 46 U/L
ANION GAP SERPL CALC-SCNC: 11 MMOL/L
AST SERPL-CCNC: 31 U/L
BILIRUB SERPL-MCNC: 0.7 MG/DL
BUN SERPL-MCNC: 16 MG/DL
CALCIUM SERPL-MCNC: 10.2 MG/DL
CHLORIDE SERPL-SCNC: 104 MMOL/L
CHOLEST SERPL-MCNC: 246 MG/DL
CHOLEST/HDLC SERPL: 7.5 {RATIO}
CO2 SERPL-SCNC: 25 MMOL/L
CREAT SERPL-MCNC: 1 MG/DL
EST. GFR  (AFRICAN AMERICAN): >60 ML/MIN/1.73 M^2
EST. GFR  (NON AFRICAN AMERICAN): >60 ML/MIN/1.73 M^2
ESTIMATED AVG GLUCOSE: 111 MG/DL
GLUCOSE SERPL-MCNC: 107 MG/DL
HAV IGM SERPL QL IA: NEGATIVE
HBA1C MFR BLD HPLC: 5.5 %
HBV CORE IGM SERPL QL IA: NEGATIVE
HBV SURFACE AG SERPL QL IA: NEGATIVE
HCV AB SERPL QL IA: NEGATIVE
HDLC SERPL-MCNC: 33 MG/DL
HDLC SERPL: 13.4 %
LDLC SERPL CALC-MCNC: 183.8 MG/DL
NONHDLC SERPL-MCNC: 213 MG/DL
POTASSIUM SERPL-SCNC: 4.8 MMOL/L
PROT SERPL-MCNC: 7.8 G/DL
SODIUM SERPL-SCNC: 140 MMOL/L
TRIGL SERPL-MCNC: 146 MG/DL

## 2018-03-07 ENCOUNTER — OFFICE VISIT (OUTPATIENT)
Dept: PODIATRY | Facility: CLINIC | Age: 58
End: 2018-03-07
Payer: COMMERCIAL

## 2018-03-07 VITALS
HEIGHT: 72 IN | DIASTOLIC BLOOD PRESSURE: 83 MMHG | BODY MASS INDEX: 30.88 KG/M2 | HEART RATE: 62 BPM | WEIGHT: 228 LBS | SYSTOLIC BLOOD PRESSURE: 118 MMHG

## 2018-03-07 DIAGNOSIS — L60.0 INGROWN NAIL: ICD-10-CM

## 2018-03-07 DIAGNOSIS — Z09 FOLLOW-UP EXAM AFTER TREATMENT: Primary | ICD-10-CM

## 2018-03-07 PROCEDURE — 99024 POSTOP FOLLOW-UP VISIT: CPT | Mod: S$GLB,,, | Performed by: PODIATRIST

## 2018-03-07 PROCEDURE — 99999 PR PBB SHADOW E&M-EST. PATIENT-LVL II: CPT | Mod: PBBFAC,,, | Performed by: PODIATRIST

## 2018-03-12 ENCOUNTER — OFFICE VISIT (OUTPATIENT)
Dept: PRIMARY CARE CLINIC | Facility: CLINIC | Age: 58
End: 2018-03-12
Payer: COMMERCIAL

## 2018-03-12 VITALS
HEIGHT: 73 IN | DIASTOLIC BLOOD PRESSURE: 79 MMHG | WEIGHT: 226.38 LBS | OXYGEN SATURATION: 97 % | BODY MASS INDEX: 30 KG/M2 | SYSTOLIC BLOOD PRESSURE: 137 MMHG | RESPIRATION RATE: 16 BRPM | TEMPERATURE: 98 F | HEART RATE: 70 BPM

## 2018-03-12 DIAGNOSIS — R79.89 ELEVATED LFTS: ICD-10-CM

## 2018-03-12 DIAGNOSIS — E78.5 HYPERLIPIDEMIA, UNSPECIFIED HYPERLIPIDEMIA TYPE: ICD-10-CM

## 2018-03-12 DIAGNOSIS — F32.A DEPRESSION, UNSPECIFIED DEPRESSION TYPE: ICD-10-CM

## 2018-03-12 DIAGNOSIS — E11.9 TYPE 2 DIABETES MELLITUS WITHOUT COMPLICATION, WITHOUT LONG-TERM CURRENT USE OF INSULIN: Primary | ICD-10-CM

## 2018-03-12 DIAGNOSIS — I10 HYPERTENSION, UNSPECIFIED TYPE: ICD-10-CM

## 2018-03-12 DIAGNOSIS — F43.10 POSTTRAUMATIC STRESS DISORDER: ICD-10-CM

## 2018-03-12 DIAGNOSIS — F41.9 ANXIETY: ICD-10-CM

## 2018-03-12 DIAGNOSIS — E66.3 OVERWEIGHT: ICD-10-CM

## 2018-03-12 PROCEDURE — 99999 PR PBB SHADOW E&M-EST. PATIENT-LVL IV: CPT | Mod: PBBFAC,,, | Performed by: FAMILY MEDICINE

## 2018-03-12 PROCEDURE — 3075F SYST BP GE 130 - 139MM HG: CPT | Mod: CPTII,S$GLB,, | Performed by: FAMILY MEDICINE

## 2018-03-12 PROCEDURE — 3078F DIAST BP <80 MM HG: CPT | Mod: CPTII,S$GLB,, | Performed by: FAMILY MEDICINE

## 2018-03-12 PROCEDURE — 3044F HG A1C LEVEL LT 7.0%: CPT | Mod: CPTII,S$GLB,, | Performed by: FAMILY MEDICINE

## 2018-03-12 PROCEDURE — 99213 OFFICE O/P EST LOW 20 MIN: CPT | Mod: S$GLB,,, | Performed by: FAMILY MEDICINE

## 2018-03-12 RX ORDER — ATORVASTATIN CALCIUM 10 MG/1
10 TABLET, FILM COATED ORAL DAILY
Qty: 90 TABLET | Refills: 3 | Status: SHIPPED | OUTPATIENT
Start: 2018-03-12 | End: 2019-01-29 | Stop reason: SDUPTHER

## 2018-03-12 NOTE — PROGRESS NOTES
Subjective:       Patient ID: Everette Hodge Jr. is a 58 y.o. male.    Chief Complaint: Follow-up (DM)    HPI: 58-year-old white male in for follow-up diabetes.  Average glucose was 129.  Review of the lab hemoglobin A1c decrease 11.2-5.5excellent, glucose decreased to  excellent, cholesterol 246 HDL 33 --needs atorvastatinTG 316--better 146 AST was 86 now 31 ALT was 136 nail 46 hepatitis panel for A, B, and C was negative     ROS:  Skin: no psoriasis, eczema, skin cancer   HEENT: No headache, ocular pain, blurred vision, diplopia, epistaxis, hoarseness change in voice, thyroid trouble  Lung: No pneumonia, asthma, Tb, wheezing, SOB, history of or fractures patient states doing well  Heart: No chest pain, ankle edema, palpitations, MI, marco antonio murmur, +hypertension, +hyperlipidemia  Abdomen: No nausea, vomiting, diarrhea, constipation, ulcers, hepatitis, gallbladder disease, melena, hematochezia, hematemesis  : no UTI, renal disease, +stones  MS: no fractures, O/A, lupus, rheumatoid, gout  Neuro: No dizziness, LOC, seizures   + diabetes, no anemia, + anxiety, + depression history of posttraumatic stress    Objective:   Physical Exam:  General: Well nourished, well developed, no acute distress slightly overweight  Skin: No lesions  HEENT: Eyes PERRLA, EOM intact, nose patent, throat non-erythematous ears TM clear   NECK: Supple, no bruits, No JVD, no nodes  Lungs: Clear, no rales, rhonchi, wheezing  Heart: Regular rate and rhythm, no murmurs, gallops, or rubs  Abdomen: flat, bowel sounds positive, no tenderness, or organomegaly  MS: Range of motion and muscle strength intact  Neuro: Alert, CN intact, oriented X 3  Extremities: No cyanosis, clubbing, or edema         Assessment:       1. Type 2 diabetes mellitus without complication, without long-term current use of insulin    2. Hypertension, unspecified type    3. Hyperlipidemia, unspecified hyperlipidemia type    4. Elevated LFTs    5. Overweight     6. Anxiety    7. Depression, unspecified depression type    8. Posttraumatic stress disorder        Plan:       Type 2 diabetes mellitus without complication, without long-term current use of insulin    Hypertension, unspecified type    Hyperlipidemia, unspecified hyperlipidemia type    Elevated LFTs    Overweight    Anxiety    Depression, unspecified depression type    Posttraumatic stress disorder      patient now exercising at the gym one half hours in the morning riding bicycles with friends 6 miles in the afternoon mediated his house and [place]  Glucose hemoglobin A1c excellent--liver enzymes markedly improved--cholesterol still increased to 46  will start on atorvastatin told if gets nauseated or muscle aches to stop the medication  Hepatitis panel was negative  HDL 33 can increase with exercise  Redo routine labs in 6 months CMP lipid and hemoglobin A1c  Do CMP in 1 month due to history of increased liver function tests and starting cholesterol medication

## 2018-03-14 ENCOUNTER — TELEPHONE (OUTPATIENT)
Dept: PRIMARY CARE CLINIC | Facility: CLINIC | Age: 58
End: 2018-03-14

## 2018-03-14 NOTE — TELEPHONE ENCOUNTER
----- Message from Maria De Jesus Washburn sent at 3/9/2018 11:01 AM CST -----  Contact: pt  Pt states that he received a call from office this morning and is calling back as requested.please call back at ....[phone]

## 2018-03-17 ENCOUNTER — PATIENT MESSAGE (OUTPATIENT)
Dept: PRIMARY CARE CLINIC | Facility: CLINIC | Age: 58
End: 2018-03-17

## 2018-04-09 ENCOUNTER — CLINICAL SUPPORT (OUTPATIENT)
Dept: PRIMARY CARE CLINIC | Facility: CLINIC | Age: 58
End: 2018-04-09
Payer: COMMERCIAL

## 2018-04-09 DIAGNOSIS — E78.5 HYPERLIPIDEMIA, UNSPECIFIED HYPERLIPIDEMIA TYPE: ICD-10-CM

## 2018-04-09 DIAGNOSIS — E11.9 TYPE 2 DIABETES MELLITUS WITHOUT COMPLICATION, WITHOUT LONG-TERM CURRENT USE OF INSULIN: ICD-10-CM

## 2018-04-09 DIAGNOSIS — I10 HYPERTENSION, UNSPECIFIED TYPE: ICD-10-CM

## 2018-04-09 LAB
ALBUMIN SERPL BCP-MCNC: 4.3 G/DL
ALP SERPL-CCNC: 90 U/L
ALT SERPL W/O P-5'-P-CCNC: 34 U/L
ANION GAP SERPL CALC-SCNC: 7 MMOL/L
AST SERPL-CCNC: 27 U/L
BILIRUB SERPL-MCNC: 0.6 MG/DL
BUN SERPL-MCNC: 15 MG/DL
CALCIUM SERPL-MCNC: 10.2 MG/DL
CHLORIDE SERPL-SCNC: 104 MMOL/L
CHOLEST SERPL-MCNC: 163 MG/DL
CHOLEST/HDLC SERPL: 4.9 {RATIO}
CO2 SERPL-SCNC: 29 MMOL/L
CREAT SERPL-MCNC: 1 MG/DL
EST. GFR  (AFRICAN AMERICAN): >60 ML/MIN/1.73 M^2
EST. GFR  (NON AFRICAN AMERICAN): >60 ML/MIN/1.73 M^2
GLUCOSE SERPL-MCNC: 107 MG/DL
HDLC SERPL-MCNC: 33 MG/DL
HDLC SERPL: 20.2 %
LDLC SERPL CALC-MCNC: 100.2 MG/DL
NONHDLC SERPL-MCNC: 130 MG/DL
POTASSIUM SERPL-SCNC: 4.3 MMOL/L
PROT SERPL-MCNC: 7.7 G/DL
SODIUM SERPL-SCNC: 140 MMOL/L
TRIGL SERPL-MCNC: 149 MG/DL

## 2018-04-09 PROCEDURE — 80053 COMPREHEN METABOLIC PANEL: CPT

## 2018-04-09 PROCEDURE — 83036 HEMOGLOBIN GLYCOSYLATED A1C: CPT

## 2018-04-09 PROCEDURE — 80061 LIPID PANEL: CPT

## 2018-04-09 PROCEDURE — 99999 PR PBB SHADOW E&M-EST. PATIENT-LVL II: CPT | Mod: PBBFAC,,,

## 2018-04-10 LAB
ESTIMATED AVG GLUCOSE: 103 MG/DL
HBA1C MFR BLD HPLC: 5.2 %

## 2018-09-06 ENCOUNTER — CLINICAL SUPPORT (OUTPATIENT)
Dept: PRIMARY CARE CLINIC | Facility: CLINIC | Age: 58
End: 2018-09-06
Payer: COMMERCIAL

## 2018-09-06 DIAGNOSIS — R79.89 ELEVATED LFTS: ICD-10-CM

## 2018-09-06 DIAGNOSIS — N20.0 NEPHROLITHIASIS: ICD-10-CM

## 2018-09-06 DIAGNOSIS — I10 ESSENTIAL HYPERTENSION, BENIGN: Primary | ICD-10-CM

## 2018-09-06 DIAGNOSIS — E11.9 TYPE 2 DIABETES MELLITUS WITHOUT COMPLICATION, WITHOUT LONG-TERM CURRENT USE OF INSULIN: ICD-10-CM

## 2018-09-06 LAB
ALBUMIN SERPL BCP-MCNC: 4.5 G/DL
ALP SERPL-CCNC: 71 U/L
ALT SERPL W/O P-5'-P-CCNC: 39 U/L
ANION GAP SERPL CALC-SCNC: 8 MMOL/L
AST SERPL-CCNC: 36 U/L
BASOPHILS # BLD AUTO: 0.1 K/UL
BASOPHILS NFR BLD: 0.7 %
BILIRUB SERPL-MCNC: 0.3 MG/DL
BUN SERPL-MCNC: 23 MG/DL
CALCIUM SERPL-MCNC: 9.4 MG/DL
CHLORIDE SERPL-SCNC: 101 MMOL/L
CHOLEST SERPL-MCNC: 154 MG/DL
CHOLEST/HDLC SERPL: 4.2 {RATIO}
CO2 SERPL-SCNC: 29 MMOL/L
CREAT SERPL-MCNC: 1 MG/DL
DIFFERENTIAL METHOD: ABNORMAL
EOSINOPHIL # BLD AUTO: 0.1 K/UL
EOSINOPHIL NFR BLD: 1.3 %
ERYTHROCYTE [DISTWIDTH] IN BLOOD BY AUTOMATED COUNT: 13.4 %
EST. GFR  (AFRICAN AMERICAN): >60 ML/MIN/1.73 M^2
EST. GFR  (NON AFRICAN AMERICAN): >60 ML/MIN/1.73 M^2
GLUCOSE SERPL-MCNC: 101 MG/DL
HCT VFR BLD AUTO: 48.3 %
HDLC SERPL-MCNC: 37 MG/DL
HDLC SERPL: 24 %
HGB BLD-MCNC: 16.5 G/DL
LDLC SERPL CALC-MCNC: 90 MG/DL
LYMPHOCYTES # BLD AUTO: 3.3 K/UL
LYMPHOCYTES NFR BLD: 34.1 %
MCH RBC QN AUTO: 31.9 PG
MCHC RBC AUTO-ENTMCNC: 34.1 G/DL
MCV RBC AUTO: 94 FL
MONOCYTES # BLD AUTO: 0.6 K/UL
MONOCYTES NFR BLD: 6.5 %
NEUTROPHILS # BLD AUTO: 5.6 K/UL
NEUTROPHILS NFR BLD: 57.4 %
NONHDLC SERPL-MCNC: 117 MG/DL
PLATELET # BLD AUTO: 304 K/UL
PMV BLD AUTO: 6.5 FL
POTASSIUM SERPL-SCNC: 4 MMOL/L
PROT SERPL-MCNC: 7.8 G/DL
RBC # BLD AUTO: 5.15 M/UL
SODIUM SERPL-SCNC: 138 MMOL/L
TRIGL SERPL-MCNC: 137 MG/DL
WBC # BLD AUTO: 9.7 K/UL

## 2018-09-06 PROCEDURE — 83036 HEMOGLOBIN GLYCOSYLATED A1C: CPT

## 2018-09-06 PROCEDURE — 99999 PR PBB SHADOW E&M-EST. PATIENT-LVL II: CPT | Mod: PBBFAC,,,

## 2018-09-07 LAB
ESTIMATED AVG GLUCOSE: 105 MG/DL
HBA1C MFR BLD HPLC: 5.3 %

## 2018-09-12 ENCOUNTER — OFFICE VISIT (OUTPATIENT)
Dept: PRIMARY CARE CLINIC | Facility: CLINIC | Age: 58
End: 2018-09-12
Payer: COMMERCIAL

## 2018-09-12 VITALS
WEIGHT: 228 LBS | DIASTOLIC BLOOD PRESSURE: 64 MMHG | OXYGEN SATURATION: 97 % | HEART RATE: 64 BPM | HEIGHT: 73 IN | BODY MASS INDEX: 30.22 KG/M2 | SYSTOLIC BLOOD PRESSURE: 106 MMHG | RESPIRATION RATE: 18 BRPM

## 2018-09-12 DIAGNOSIS — F41.9 ANXIETY: Primary | ICD-10-CM

## 2018-09-12 DIAGNOSIS — N20.0 NEPHROLITHIASIS: ICD-10-CM

## 2018-09-12 DIAGNOSIS — E66.3 OVERWEIGHT: ICD-10-CM

## 2018-09-12 DIAGNOSIS — E78.5 HYPERLIPIDEMIA, UNSPECIFIED HYPERLIPIDEMIA TYPE: ICD-10-CM

## 2018-09-12 DIAGNOSIS — I10 HYPERTENSION, UNSPECIFIED TYPE: ICD-10-CM

## 2018-09-12 DIAGNOSIS — Z23 NEED FOR PROPHYLACTIC VACCINATION AND INOCULATION AGAINST INFLUENZA: ICD-10-CM

## 2018-09-12 DIAGNOSIS — E11.9 TYPE 2 DIABETES MELLITUS WITHOUT COMPLICATION, WITHOUT LONG-TERM CURRENT USE OF INSULIN: ICD-10-CM

## 2018-09-12 DIAGNOSIS — F32.A DEPRESSION, UNSPECIFIED DEPRESSION TYPE: ICD-10-CM

## 2018-09-12 DIAGNOSIS — F43.10 POSTTRAUMATIC STRESS DISORDER: ICD-10-CM

## 2018-09-12 PROBLEM — R79.89 ELEVATED LFTS: Status: RESOLVED | Noted: 2017-12-03 | Resolved: 2018-09-12

## 2018-09-12 PROCEDURE — 3044F HG A1C LEVEL LT 7.0%: CPT | Mod: CPTII,S$GLB,, | Performed by: FAMILY MEDICINE

## 2018-09-12 PROCEDURE — 3078F DIAST BP <80 MM HG: CPT | Mod: CPTII,S$GLB,, | Performed by: FAMILY MEDICINE

## 2018-09-12 PROCEDURE — 90471 IMMUNIZATION ADMIN: CPT | Mod: S$GLB,,, | Performed by: FAMILY MEDICINE

## 2018-09-12 PROCEDURE — 99999 PR PBB SHADOW E&M-EST. PATIENT-LVL IV: CPT | Mod: PBBFAC,,, | Performed by: FAMILY MEDICINE

## 2018-09-12 PROCEDURE — 90686 IIV4 VACC NO PRSV 0.5 ML IM: CPT | Mod: S$GLB,,, | Performed by: FAMILY MEDICINE

## 2018-09-12 PROCEDURE — 3074F SYST BP LT 130 MM HG: CPT | Mod: CPTII,S$GLB,, | Performed by: FAMILY MEDICINE

## 2018-09-12 PROCEDURE — 3008F BODY MASS INDEX DOCD: CPT | Mod: CPTII,S$GLB,, | Performed by: FAMILY MEDICINE

## 2018-09-12 PROCEDURE — 99213 OFFICE O/P EST LOW 20 MIN: CPT | Mod: 25,S$GLB,, | Performed by: FAMILY MEDICINE

## 2018-09-12 RX ORDER — ZOSTER VACCINE RECOMBINANT, ADJUVANTED 50 MCG/0.5
KIT INTRAMUSCULAR
Refills: 0 | COMMUNITY
Start: 2018-09-01 | End: 2019-01-31

## 2018-09-12 NOTE — PROGRESS NOTES
Patient ID by name and . NKDA. Influenza vaccine given IM in right deltoid using aseptic technique. Aspirated with no blood noted. Patient tolerated well. Given per physicians order. No adverse reaction noted.

## 2018-09-12 NOTE — PROGRESS NOTES
Subjective:       Patient ID: Everette Hodge Jr. is a 58 y.o. male.    Chief Complaint: Results    HPI: 58-year-old white male --PATIENT IN FOR LAB RESULT BUN 23 takes a lot of weight protein per day HDL 37 better of 40 can increase with exercise.  Patient works out a lot at the gym   ROS:  Skin: no psoriasis, eczema, skin cancer   HEENT: No headache, ocular pain, blurred vision, diplopia, epistaxis, hoarseness change in voice, thyroid trouble  Lung: No pneumonia, asthma, Tb, wheezing, SOB  Heart: No chest pain, ankle edema, palpitations, MI, marco antonio murmur, +hypertension, +hyperlipidemia  Abdomen: No nausea, vomiting, diarrhea, constipation, ulcers, hepatitis, gallbladder disease, melena, hematochezia, hematemesis  : no UTI, renal disease, +stones  MS: no fractures, O/A, lupus, rheumatoid, gout  Neuro: No dizziness, LOC, seizures   + diabetes, no anemia, + anxiety, + depression history of posttraumatic stress    Objective:   Physical Exam:  General: Well nourished, well developed, no acute distress slightly overweight  Skin: No lesions  HEENT: Eyes PERRLA, EOM intact, nose patent, throat non-erythematous ears TM clear   NECK: Supple, no bruits, No JVD, no nodes  Lungs: Clear, no rales, rhonchi, wheezing  Heart: Regular rate and rhythm, no murmurs, gallops, or rubs  Abdomen: flat, bowel sounds positive, no tenderness, or organomegaly  MS: Range of motion and muscle strength intact  Neuro: Alert, CN intact, oriented X 3  Extremities: No cyanosis, clubbing, or edema         Assessment:       1. Anxiety    2. Depression, unspecified depression type    3. Posttraumatic stress disorder    4. Hypertension, unspecified type    5. Hyperlipidemia, unspecified hyperlipidemia type    6. Nephrolithiasis    7. Overweight    8. Type 2 diabetes mellitus without complication, without long-term current use of insulin        Plan:       Anxiety    Depression, unspecified depression type    Posttraumatic stress  disorder    Hypertension, unspecified type  -     CBC auto differential; Future; Expected date: 03/12/2019  -     Hemoglobin A1c; Future; Expected date: 03/12/2019  -     Comprehensive metabolic panel; Future; Expected date: 03/12/2019    Hyperlipidemia, unspecified hyperlipidemia type  -     Lipid panel; Future; Expected date: 03/12/2019    Nephrolithiasis    Overweight    Type 2 diabetes mellitus without complication, without long-term current use of insulin      patient now exercising at the gym one half hours in the morning riding bicycles with friends 6 miles in the afternoon mediated his house and [place]  HDL 37 can increase with exercise  Redo routine labs in 6 months CBCs CMP lipids

## 2018-09-19 ENCOUNTER — TELEPHONE (OUTPATIENT)
Dept: PRIMARY CARE CLINIC | Facility: CLINIC | Age: 58
End: 2018-09-19

## 2018-09-19 NOTE — TELEPHONE ENCOUNTER
----- Message from Mary Paz sent at 9/19/2018 11:12 AM CDT -----  Contact: [phone]  Patient is returning nurse's phone call.  Please call patient back at [phone].

## 2018-12-10 ENCOUNTER — OFFICE VISIT (OUTPATIENT)
Dept: PODIATRY | Facility: CLINIC | Age: 58
End: 2018-12-10
Payer: COMMERCIAL

## 2018-12-10 DIAGNOSIS — E11.9 ENCOUNTER FOR DIABETIC FOOT EXAM: Primary | ICD-10-CM

## 2018-12-10 DIAGNOSIS — L60.0 INGROWN NAIL: ICD-10-CM

## 2018-12-10 DIAGNOSIS — M79.674 PAIN OF TOE OF RIGHT FOOT: ICD-10-CM

## 2018-12-10 PROCEDURE — 99214 OFFICE O/P EST MOD 30 MIN: CPT | Mod: S$GLB,,, | Performed by: PODIATRIST

## 2018-12-10 PROCEDURE — 99999 PR PBB SHADOW E&M-EST. PATIENT-LVL III: CPT | Mod: PBBFAC,,, | Performed by: PODIATRIST

## 2018-12-10 PROCEDURE — 3044F HG A1C LEVEL LT 7.0%: CPT | Mod: CPTII,S$GLB,, | Performed by: PODIATRIST

## 2018-12-10 NOTE — PATIENT INSTRUCTIONS
How to Check Your Feet    Below are tips to help you look for foot problems. Try to check your feet at the same time each day, such as when you get out of bed in the morning.    · Check the top of each foot. The tops of toes, back of the heel, and outer edge of the foot can get a lot of rubbing from poor-fitting shoes.    · Check the bottom of each foot. Daily wear and tear often leads to problems at pressure spots.    · Check the toes and nails. Fungal infections often occur between toes. Toenail problems can also be a sign of fungal infections or lead to breaks in the skin.    · Check your shoes, too. Loose objects inside a shoe can injure the foot. Use your hand to feel inside your shoes for things like daryl, loose stitching, or rough areas that could irritate your skin.        Diabetic Foot Care    Diabetes can lead to a number of different foot complications. Fortunately, most of these complications can be prevented with a little extra foot care. If diabetes is not well controlled, the high blood sugar can cause damage to blood vessels and result in poor circulation to the foot. When the skin does not get enough blood flow, it becomes prone to pressure sores and ulcers, which heal slowly.  High blood sugar can also damage nerves, interfering with the ability to feel pain and pressure. When you cant feel your foot normally, it is easy to injure your skin, bones and joints without knowing it. For these reasons diabetes increases the risk of fungal infections, bunions and ulcers. Deep ulcers can lead to bone infection. Gangrene is the most serious foot complication of diabetes. It usually occurs on the tips of the toes as blacked areas of skin. The black area is dead tissue. In severe cases, gangrene spreads to involve the entire toe, other toes and the entire foot. Foot or toe amputation may be required. Good foot care and blood sugar control can prevent this.    Home Care  1. Wear comfortable, proper fitting  shoes.  2. Wash your feet daily with warm water and mild soap.  3. After drying, apply a moisturizing cream or lotion.  4. Check your feet daily for skin breaks, blisters, swelling, or redness. Look between your toes also.  5. Wear cotton socks and change them every day.  6. Trim toe nails carefully and do not cut your cuticles.  7. Strive to keep your blood sugar under control with a combination of medicines, diet and activity.  8. If you smoke and have diabetes, it is very important that you stop. Smoking reduces blood flow to your foot.  9. Avoid activities that increase your risk of foot injury:  · Do not walk barefoot.  · Do not use heating pads or hot water bottles on your feet.  · Do not put your foot in a hot tub without first checking the temperature with your hand.  10) Schedule yearly foot exams.    Follow Up  with your doctor or as advised by our staff. Report any cut, puncture, scrape, other injury, blister, ingrown toenail or ulcer on your foot.    Get Prompt Medical Attention  if any of the following occur:  -- Open ulcer with pus draining from the wound  -- Increasing foot or leg pain  -- New areas of redness or swelling or tender areas of the foot    © 0230-5372 [employer]. [address] [place] [address], [place] [address]. All rights reserved. This information is not intended as a substitute for professional medical care. Always follow your healthcare professional's instructions.

## 2018-12-10 NOTE — PROGRESS NOTES
Chief Complaint   Patient presents with    Ingrown Toenail     Right great toe    Diabetes Mellitus     PCP: Dorian Allen 08/01/2018  A1C: 09/06/2018 / 5.3           HPI:   The patient is a 58 y.o.  male  who presents for a diabetic foot exam.     Patient reports no presence of abnormal sensation to the feet .    History of diabetic foot ulcers: none   History of foot surgery: none.     Shoes worn today:  Casual slip on shoes.   history of bilateral hallux ingrown nail matrixectomy.  Feels that right hallux lateral border ingrown is recurring for the past couple of weeks.          Primary care doctor is: Dorian Allen MD  Chief Complaint   Patient presents with    Ingrown Toenail     Right great toe    Diabetes Mellitus     PCP: Dorian Allen 08/01/2018  A1C: 09/06/2018 / 5.3          Past Medical History:   Diagnosis Date    Cataract     Depression     Diabetes mellitus     Diabetes mellitus, type 2     Hypertension     PTSD (post-traumatic stress disorder)            Current Outpatient Medications on File Prior to Visit   Medication Sig Dispense Refill    amLODIPine (NORVASC) 5 MG tablet TAKE 1 TABLET BY MOUTH EVERY DAY 90 tablet 1    ascorbic acid, vitamin C, (VITAMIN C) 1000 MG tablet Take 2,000 mg by mouth once daily.      aspirin (ECOTRIN) 81 MG EC tablet Take 81 mg by mouth once daily.      atorvastatin (LIPITOR) 10 MG tablet Take 1 tablet (10 mg total) by mouth once daily. 90 tablet 3    blood sugar diagnostic (TRUE METRIX GLUCOSE TEST STRIP) Strp 1 strip by Misc.(Non-Drug; Combo Route) route 3 (three) times daily. 200 strip 11    fish oil-omega-3 fatty acids 300-1,000 mg capsule Take 500 mg by mouth once daily.      FLUoxetine (PROZAC) 40 MG capsule Take 40 mg by mouth once daily.      lancets 33 gauge Misc 1 lancet by Misc.(Non-Drug; Combo Route) route 3 (three) times daily. 200 each 11    LORazepam (ATIVAN) 1 MG tablet Take 1 mg by mouth 3 (three) times daily.      metFORMIN  (GLUCOPHAGE) 500 MG tablet Take 1 tablet (500 mg total) by mouth 2 (two) times daily with meals. 180 tablet 1    milk thistle 175 mg tablet Take 1,000 mg by mouth 2 (two) times daily.       multivitamin (ONE DAILY MULTIVITAMIN) per tablet Take 1 tablet by mouth once daily.      naproxen sodium (ANAPROX) 220 MG tablet Take 440 mg by mouth once daily.      SHINGRIX, PF, 50 mcg/0.5 mL injection ADM 0.5ML IM UTD  0    traZODone (DESYREL) 150 MG tablet Take 75 mg by mouth every evening.      blood-glucose meter (TRUE METRIX GLUCOSE METER) Misc 1 kit by Misc.(Non-Drug; Combo Route) route once. 1 each 0     No current facility-administered medications on file prior to visit.            Review of patient's allergies indicates:  No Known Allergies        Social History     Socioeconomic History    Marital status:      Spouse name: Not on file    Number of children: Not on file    Years of education: Not on file    Highest education level: Not on file   Social Needs    Financial resource strain: Not on file    Food insecurity - worry: Not on file    Food insecurity - inability: Not on file    Transportation needs - medical: Not on file    Transportation needs - non-medical: Not on file   Occupational History    Not on file   Tobacco Use    Smoking status: Former Smoker     Types: Cigarettes    Smokeless tobacco: Never Used   Substance and Sexual Activity    Alcohol use: No    Drug use: No    Sexual activity: Yes     Partners: Male   Other Topics Concern    Not on file   Social History Narrative    Not on file           ROS:  General ROS: negative  Respiratory ROS: no cough, shortness of breath, or wheezing  Cardiovascular ROS: no chest pain or dyspnea on exertion  Musculoskeletal ROS: negative  Neurological ROS:   negative for - impaired coordination/balance or numbness/tingling  Dermatological ROS: negative      LAST HbA1c:   Hemoglobin A1C   Date Value Ref Range Status   09/06/2018 5.3 4.0 -  5.6 % Final     Comment:     ADA Screening Guidelines:  5.7-6.4%  Consistent with prediabetes  >or=6.5%  Consistent with diabetes  High levels of fetal hemoglobin interfere with the HbA1C  assay. Heterozygous hemoglobin variants (HbS, HgC, etc)do  not significantly interfere with this assay.   However, presence of multiple variants may affect accuracy.     04/09/2018 5.2 4.0 - 5.6 % Final     Comment:     According to ADA guidelines, hemoglobin A1c <7.0% represents  optimal control in non-pregnant diabetic patients. Different  metrics may apply to specific patient populations.   Standards of Medical Care in Diabetes-2016.  For the purpose of screening for the presence of diabetes:  <5.7%     Consistent with the absence of diabetes  5.7-6.4%  Consistent with increasing risk for diabetes   (prediabetes)  >or=6.5%  Consistent with diabetes  Currently, no consensus exists for use of hemoglobin A1c  for diagnosis of diabetes for children.  This Hemoglobin A1c assay has significant interference with fetal   hemoglobin   (HbF). The results are invalid for patients with abnormal amounts of   HbF,   including those with known Hereditary Persistence   of Fetal Hemoglobin. Heterozygous hemoglobin variants (HbAS, HbAC,   HbAD, HbAE, HbA2) do not significantly interfere with this assay;   however, presence of multiple variants in a sample may impact the %   interference.     03/01/2018 5.5 4.0 - 5.6 % Final     Comment:     According to ADA guidelines, hemoglobin A1c <7.0% represents  optimal control in non-pregnant diabetic patients. Different  metrics may apply to specific patient populations.   Standards of Medical Care in Diabetes-2016.  For the purpose of screening for the presence of diabetes:  <5.7%     Consistent with the absence of diabetes  5.7-6.4%  Consistent with increasing risk for diabetes   (prediabetes)  >or=6.5%  Consistent with diabetes  Currently, no consensus exists for use of hemoglobin A1c  for diagnosis  of diabetes for children.  This Hemoglobin A1c assay has significant interference with fetal   hemoglobin   (HbF). The results are invalid for patients with abnormal amounts of   HbF,   including those with known Hereditary Persistence   of Fetal Hemoglobin. Heterozygous hemoglobin variants (HbAS, HbAC,   HbAD, HbAE, HbA2) do not significantly interfere with this assay;   however, presence of multiple variants in a sample may impact the %   interference.             EXAM:   There were no vitals filed for this visit.    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   Ulceration -  Neither, Blister -  Neither, Skin Breakdown -  Neither and Callus -  Neither  Thickened bilateral hallux toenails, yellow with hyperkeratosis of the lateral border of the right hallux without paronychia or infection.     Pedal Pulses:   Right: Present  Left: Present    Posterior tibialis pulses:   Right:Present  Left: Present     No LOPS    5/5 muscle strength.                      ASSESSMENT/PLAN:      Problem List Items Addressed This Visit     None      Visit Diagnoses     Encounter for diabetic foot exam    -  Primary    Pain of toe of right foot        Ingrown nail                I counseled the patient on his conditions, their implications and medical management.     Shoe inspection. Diabetic Foot Education. Patient reminded of the importance of good nutrition and blood sugar control to help prevent podiatric complications of diabetes. Patient instructed on proper foot hygiene. We discussed wearing proper shoe gear, daily foot inspections, never walking without protective shoe gear, never putting sharp instruments to feet.    The hyperkeratosis of the right hallux lateral nail border was trimmed without complications.  Patient  advised to consider urea 10% daily to the border to moistruize.     Follow-up in about 1 year (around 12/10/2019) for diabetic foot exam, or sooner if concerned.             Sallie Garg DPM  NPI: [id number]

## 2018-12-17 RX ORDER — AMLODIPINE BESYLATE 5 MG/1
TABLET ORAL
Qty: 90 TABLET | Refills: 2 | Status: SHIPPED | OUTPATIENT
Start: 2018-12-17 | End: 2019-02-01 | Stop reason: SDUPTHER

## 2019-01-29 RX ORDER — ATORVASTATIN CALCIUM 10 MG/1
TABLET, FILM COATED ORAL
Qty: 90 TABLET | Refills: 0 | Status: CANCELLED | OUTPATIENT
Start: 2019-01-29

## 2019-01-30 RX ORDER — ATORVASTATIN CALCIUM 10 MG/1
TABLET, FILM COATED ORAL
Qty: 90 TABLET | Refills: 0 | Status: SHIPPED | OUTPATIENT
Start: 2019-01-30 | End: 2019-05-09 | Stop reason: SDUPTHER

## 2019-01-30 NOTE — TELEPHONE ENCOUNTER
----- Message from Mary Anne MP Rose sent at 1/30/2019 10:45 AM CST -----  Contact: self  Type:  RX Refill Request    Who Called:  self  Refill or New Rx:  refill  RX Name and Strength:  atorvastatin (LIPITOR) 10 MG tablet  How is the patient currently taking it? (ex. 1XDay):  1Xday  Is this a 30 day or 90 day RX:  90  Preferred Pharmacy with phone number:  [facility]  Local or Mail Order:  local  Ordering Provider:  Dr Allen  Best Call Back Number:  [phone]  Additional Information:  Patient states pharmacy has been contacting office since Friday 01/25/19. Please call patient when sent. Thanks!       [facility] - [place] - [address] AT [facility] [place] [address]  [address] [address]  Phone: [phone] Fax: [phone]

## 2019-01-31 ENCOUNTER — OFFICE VISIT (OUTPATIENT)
Dept: PRIMARY CARE CLINIC | Facility: CLINIC | Age: 59
End: 2019-01-31
Payer: MEDICARE

## 2019-01-31 VITALS
HEART RATE: 74 BPM | WEIGHT: 227 LBS | RESPIRATION RATE: 18 BRPM | OXYGEN SATURATION: 98 % | BODY MASS INDEX: 30.75 KG/M2 | SYSTOLIC BLOOD PRESSURE: 137 MMHG | DIASTOLIC BLOOD PRESSURE: 72 MMHG | HEIGHT: 72 IN

## 2019-01-31 DIAGNOSIS — E16.2 HYPOGLYCEMIA: Primary | ICD-10-CM

## 2019-01-31 DIAGNOSIS — E66.3 OVERWEIGHT: ICD-10-CM

## 2019-01-31 DIAGNOSIS — E11.9 TYPE 2 DIABETES MELLITUS WITHOUT COMPLICATION, WITHOUT LONG-TERM CURRENT USE OF INSULIN: ICD-10-CM

## 2019-01-31 DIAGNOSIS — F41.9 ANXIETY: ICD-10-CM

## 2019-01-31 DIAGNOSIS — E78.5 HYPERLIPIDEMIA, UNSPECIFIED HYPERLIPIDEMIA TYPE: ICD-10-CM

## 2019-01-31 DIAGNOSIS — I10 HYPERTENSION, UNSPECIFIED TYPE: ICD-10-CM

## 2019-01-31 DIAGNOSIS — F32.A DEPRESSION, UNSPECIFIED DEPRESSION TYPE: ICD-10-CM

## 2019-01-31 DIAGNOSIS — N20.0 NEPHROLITHIASIS: ICD-10-CM

## 2019-01-31 PROCEDURE — 99999 PR PBB SHADOW E&M-EST. PATIENT-LVL III: ICD-10-PCS | Mod: PBBFAC,,, | Performed by: FAMILY MEDICINE

## 2019-01-31 PROCEDURE — 99214 PR OFFICE/OUTPT VISIT, EST, LEVL IV, 30-39 MIN: ICD-10-PCS | Mod: S$GLB,,, | Performed by: FAMILY MEDICINE

## 2019-01-31 PROCEDURE — 3078F DIAST BP <80 MM HG: CPT | Mod: CPTII,S$GLB,, | Performed by: FAMILY MEDICINE

## 2019-01-31 PROCEDURE — 99214 OFFICE O/P EST MOD 30 MIN: CPT | Mod: S$GLB,,, | Performed by: FAMILY MEDICINE

## 2019-01-31 PROCEDURE — 3075F PR MOST RECENT SYSTOLIC BLOOD PRESS GE 130-139MM HG: ICD-10-PCS | Mod: CPTII,S$GLB,, | Performed by: FAMILY MEDICINE

## 2019-01-31 PROCEDURE — 3044F PR MOST RECENT HEMOGLOBIN A1C LEVEL <7.0%: ICD-10-PCS | Mod: CPTII,S$GLB,, | Performed by: FAMILY MEDICINE

## 2019-01-31 PROCEDURE — 3044F HG A1C LEVEL LT 7.0%: CPT | Mod: CPTII,S$GLB,, | Performed by: FAMILY MEDICINE

## 2019-01-31 PROCEDURE — 3008F PR BODY MASS INDEX (BMI) DOCUMENTED: ICD-10-PCS | Mod: CPTII,S$GLB,, | Performed by: FAMILY MEDICINE

## 2019-01-31 PROCEDURE — 3075F SYST BP GE 130 - 139MM HG: CPT | Mod: CPTII,S$GLB,, | Performed by: FAMILY MEDICINE

## 2019-01-31 PROCEDURE — 3008F BODY MASS INDEX DOCD: CPT | Mod: CPTII,S$GLB,, | Performed by: FAMILY MEDICINE

## 2019-01-31 PROCEDURE — 99999 PR PBB SHADOW E&M-EST. PATIENT-LVL III: CPT | Mod: PBBFAC,,, | Performed by: FAMILY MEDICINE

## 2019-01-31 PROCEDURE — 3078F PR MOST RECENT DIASTOLIC BLOOD PRESSURE < 80 MM HG: ICD-10-PCS | Mod: CPTII,S$GLB,, | Performed by: FAMILY MEDICINE

## 2019-01-31 RX ORDER — TESTOSTERONE CYPIONATE 200 MG/ML
INJECTION, SOLUTION INTRAMUSCULAR
COMMUNITY

## 2019-01-31 RX ORDER — ANASTROZOLE 1 MG/1
0.5 TABLET ORAL
COMMUNITY
End: 2020-05-28

## 2019-01-31 RX ORDER — LOSARTAN POTASSIUM 50 MG/1
50 TABLET ORAL DAILY
Qty: 90 TABLET | Refills: 1 | Status: SHIPPED | OUTPATIENT
Start: 2019-01-31 | End: 2019-05-14 | Stop reason: SDUPTHER

## 2019-01-31 NOTE — TELEPHONE ENCOUNTER
Call tell pty last BP 17 systolic OK take cozaar but hold it if systolic less than 100 or diastolic less than 90

## 2019-01-31 NOTE — TELEPHONE ENCOUNTER
----- Message from Lefty Bonilla sent at 1/31/2019  3:42 PM CST -----  Type: Needs Medical Advice    Who Called:  Patient  Best Call Back Number: [phone]  Additional Information: Patient needs to speak to office regarding medication - please call to advise.

## 2019-01-31 NOTE — TELEPHONE ENCOUNTER
Call tell lab due q 6 mo if DM or HLP. Pt needs do lab CBVC,CMP,lipid, HGbA1C needs see me 2 weeks later Lab due March OK 3 mo refills give time come in get lab and get seen

## 2019-01-31 NOTE — TELEPHONE ENCOUNTER
Pt states pharmacy still did not get the medication for his blood pressure that Dr BLACKBURN was supposed to Escribe this morning

## 2019-01-31 NOTE — PROGRESS NOTES
Subjective:       Patient ID: Everette Hodge Jr. is a 58 y.o. male.    Chief Complaint: Hospital Follow Up (ER f/u hypoglycemia )    HPI: 58-year-old white male --Pt training for-[place] race--3 miles with obstacles.  Patient still with blood pressure high 169/88--on amlodipine 5 min. Hx hypoglycemia --has happened several times in the past--yesterday occurred time x2.  Got a piece of chocolate 8 it 30 min later was okay. Got home -7 PM --dizzy, flush , lightheaded , glucose 54.  Patient is on testosterone 5 mg twice a week--using it for energy while doing as strain.  He is on metformin 500 b.i.d.Takes glucose 3 x day --rarely   ROS:  Skin: no psoriasis, eczema, skin cancer   HEENT: No headache, ocular pain, blurred vision, diplopia, epistaxis, hoarseness change in voice, thyroid trouble   Lung: No pneumonia, asthma, Tb, wheezing, SOB  Heart: No chest pain, ankle edema, palpitations, MI, marco antonio murmur, +hypertension, +hyperlipidemia  Abdomen: No nausea, vomiting, diarrhea, constipation, ulcers, hepatitis, gallbladder disease, melena, hematochezia, hematemesis  : no UTI, renal disease, +stones  MS: no fractures, O/A, lupus, rheumatoid, gout  Neuro: No dizziness, LOC, seizures   + diabetes, no anemia, + anxiety, + depression history of posttraumatic stress    Objective:   Physical Exam:  General: Well nourished, well developed, no acute distress slightly overweight  Skin: No lesions  HEENT: Eyes PERRLA, EOM intact, nose patent, throat non-erythematous ears TM clear   NECK: Supple, no bruits, No JVD, no nodes  Lungs: Clear, no rales, rhonchi, wheezing  Heart: Regular rate and rhythm, no murmurs, gallops, or rubs  Abdomen: flat, bowel sounds positive, no tenderness, or organomegaly  MS: Range of motion and muscle strength intact  Neuro: Alert, CN intact, oriented X 3  Extremities: No cyanosis, clubbing, or edema         Assessment:       1. Hypoglycemia    2. Hypertension, unspecified type    3. Hyperlipidemia,  unspecified hyperlipidemia type    4. Nephrolithiasis    5. Type 2 diabetes mellitus without complication, without long-term current use of insulin    6. Overweight    7. Anxiety    8. Depression, unspecified depression type        Plan:       Hypoglycemia    Hypertension, unspecified type    Hyperlipidemia, unspecified hyperlipidemia type    Nephrolithiasis    Type 2 diabetes mellitus without complication, without long-term current use of insulin    Overweight    Anxiety    Depression, unspecified depression type      Pt with hypoglycemia--patient was on metformin 500 mg b.i.d.--is on 1800 calorie ADA low-sodium low-fat diet--exercises--doing extremely well with weight  DC metformin--CMP and hemoglobin A1c in 3 months--if hemoglobin A1c greater than 6 consider 250 mg a metformin  Blood pressure 169/88 will add Cozaar take amlodipine 5 mg q.h.s. Cozaar 50 mg q.a.m. get arm blood pressure cuff check blood pressure daily trying get blood pressure systolic less than or equal to 140 diastolic less than or equal 90  On testosterone .

## 2019-02-03 RX ORDER — AMLODIPINE BESYLATE 5 MG/1
TABLET ORAL
Qty: 30 TABLET | Refills: 5 | Status: SHIPPED | OUTPATIENT
Start: 2019-02-03 | End: 2019-10-09 | Stop reason: SDUPTHER

## 2019-03-19 PROBLEM — R79.89 ELEVATED LIVER FUNCTION TESTS: Status: ACTIVE | Noted: 2019-03-19

## 2019-03-20 ENCOUNTER — OFFICE VISIT (OUTPATIENT)
Dept: PRIMARY CARE CLINIC | Facility: CLINIC | Age: 59
End: 2019-03-20
Payer: MEDICARE

## 2019-03-20 VITALS
RESPIRATION RATE: 18 BRPM | SYSTOLIC BLOOD PRESSURE: 107 MMHG | OXYGEN SATURATION: 98 % | HEIGHT: 72 IN | WEIGHT: 225 LBS | DIASTOLIC BLOOD PRESSURE: 62 MMHG | BODY MASS INDEX: 30.48 KG/M2 | HEART RATE: 74 BPM

## 2019-03-20 DIAGNOSIS — F43.10 POSTTRAUMATIC STRESS DISORDER: ICD-10-CM

## 2019-03-20 DIAGNOSIS — R79.89 ELEVATED LIVER FUNCTION TESTS: Primary | ICD-10-CM

## 2019-03-20 DIAGNOSIS — E78.5 HYPERLIPIDEMIA, UNSPECIFIED HYPERLIPIDEMIA TYPE: ICD-10-CM

## 2019-03-20 DIAGNOSIS — I10 HYPERTENSION, UNSPECIFIED TYPE: ICD-10-CM

## 2019-03-20 DIAGNOSIS — E11.9 TYPE 2 DIABETES MELLITUS WITHOUT COMPLICATION, WITHOUT LONG-TERM CURRENT USE OF INSULIN: ICD-10-CM

## 2019-03-20 DIAGNOSIS — F32.A DEPRESSION, UNSPECIFIED DEPRESSION TYPE: ICD-10-CM

## 2019-03-20 DIAGNOSIS — Z12.11 COLON CANCER SCREENING: ICD-10-CM

## 2019-03-20 DIAGNOSIS — F41.9 ANXIETY: ICD-10-CM

## 2019-03-20 DIAGNOSIS — E66.3 OVERWEIGHT: ICD-10-CM

## 2019-03-20 PROCEDURE — 3044F PR MOST RECENT HEMOGLOBIN A1C LEVEL <7.0%: ICD-10-PCS | Mod: CPTII,S$GLB,, | Performed by: FAMILY MEDICINE

## 2019-03-20 PROCEDURE — 3008F BODY MASS INDEX DOCD: CPT | Mod: CPTII,S$GLB,, | Performed by: FAMILY MEDICINE

## 2019-03-20 PROCEDURE — 3074F PR MOST RECENT SYSTOLIC BLOOD PRESSURE < 130 MM HG: ICD-10-PCS | Mod: CPTII,S$GLB,, | Performed by: FAMILY MEDICINE

## 2019-03-20 PROCEDURE — 3074F SYST BP LT 130 MM HG: CPT | Mod: CPTII,S$GLB,, | Performed by: FAMILY MEDICINE

## 2019-03-20 PROCEDURE — 3044F HG A1C LEVEL LT 7.0%: CPT | Mod: CPTII,S$GLB,, | Performed by: FAMILY MEDICINE

## 2019-03-20 PROCEDURE — 99213 OFFICE O/P EST LOW 20 MIN: CPT | Mod: S$GLB,,, | Performed by: FAMILY MEDICINE

## 2019-03-20 PROCEDURE — 99999 PR PBB SHADOW E&M-EST. PATIENT-LVL III: CPT | Mod: PBBFAC,,, | Performed by: FAMILY MEDICINE

## 2019-03-20 PROCEDURE — 99999 PR PBB SHADOW E&M-EST. PATIENT-LVL III: ICD-10-PCS | Mod: PBBFAC,,, | Performed by: FAMILY MEDICINE

## 2019-03-20 PROCEDURE — 3008F PR BODY MASS INDEX (BMI) DOCUMENTED: ICD-10-PCS | Mod: CPTII,S$GLB,, | Performed by: FAMILY MEDICINE

## 2019-03-20 PROCEDURE — 99213 PR OFFICE/OUTPT VISIT, EST, LEVL III, 20-29 MIN: ICD-10-PCS | Mod: S$GLB,,, | Performed by: FAMILY MEDICINE

## 2019-03-20 PROCEDURE — 3078F DIAST BP <80 MM HG: CPT | Mod: CPTII,S$GLB,, | Performed by: FAMILY MEDICINE

## 2019-03-20 PROCEDURE — 3078F PR MOST RECENT DIASTOLIC BLOOD PRESSURE < 80 MM HG: ICD-10-PCS | Mod: CPTII,S$GLB,, | Performed by: FAMILY MEDICINE

## 2019-03-20 NOTE — PROGRESS NOTES
Subjective:       Patient ID: Everette Hodge Jr. is a 59 y.o. male.    Chief Complaint: Results    HPI: 60 yo WM --CBC normal BUN 25 AST 45  better of 100 HDL 31 can increase with exercise hemoglobin A1c 5.3 excellent glucose 118 very good. Pt on atorvastatin and testosterone AST 45   ROS:  Skin: no psoriasis, eczema, skin cancer   HEENT: No headache, ocular pain, blurred vision, diplopia, epistaxis, hoarseness change in voice, thyroid trouble   Lung: No pneumonia, asthma, Tb, wheezing, SOB  Heart: No chest pain, ankle edema, palpitations, MI, marco antonio murmur, +hypertension, +hyperlipidemia  Abdomen: No nausea, vomiting, diarrhea, constipation, ulcers, hepatitis, gallbladder disease, melena, hematochezia, hematemesis  : no UTI, renal disease, +stones  MS: no fractures, O/A, lupus, rheumatoid, gout  Neuro: No dizziness, LOC, seizures   + diabetes, no anemia, + anxiety, + depression history of posttraumatic stress--getting better--pt states goes gym everyday for 2 hrs, every evening trial run busy with [employer] activities even though retiredEvery month does spartan races around [place]   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 Elevated liver function tests    2. Type 2 diabetes mellitus without complication, without long-term current use of insulin    3. Overweight    4. Hyperlipidemia, unspecified hyperlipidemia type    5. Hypertension,  unspecified type    6. Depression, unspecified depression type    7. Anxiety    8. Posttraumatic stress disorder        Plan:       Elevated liver function tests    Type 2 diabetes mellitus without complication, without long-term current use of insulin    Overweight    Hyperlipidemia, unspecified hyperlipidemia type    Hypertension, unspecified type    Depression, unspecified depression type    Anxiety    Posttraumatic stress disorder       better 100, HDL 31 better >40 cont exercise , staying on diet   On testosterone .cycle neds soon will recheck CMP in 3 mo see if LFT better   Needs CBC,CMP,lipid, HGbA1c in 6 mo had stool guaiac due to increased BUN 25  Told elevated liver function test and be secondary to testosterone or atorvastatin avoid alcohol

## 2019-04-25 ENCOUNTER — OFFICE VISIT (OUTPATIENT)
Dept: SPORTS MEDICINE | Facility: CLINIC | Age: 59
End: 2019-04-25
Payer: MEDICARE

## 2019-04-25 ENCOUNTER — HOSPITAL ENCOUNTER (OUTPATIENT)
Dept: RADIOLOGY | Facility: HOSPITAL | Age: 59
Discharge: HOME | End: 2019-04-25
Attending: PHYSICIAN ASSISTANT
Payer: MEDICARE

## 2019-04-25 VITALS
HEIGHT: 72 IN | SYSTOLIC BLOOD PRESSURE: 139 MMHG | DIASTOLIC BLOOD PRESSURE: 81 MMHG | BODY MASS INDEX: 30.48 KG/M2 | HEART RATE: 89 BPM | WEIGHT: 225 LBS

## 2019-04-25 DIAGNOSIS — M25.511 RIGHT SHOULDER PAIN, UNSPECIFIED CHRONICITY: ICD-10-CM

## 2019-04-25 DIAGNOSIS — M75.101 ROTATOR CUFF SYNDROME OF RIGHT SHOULDER: ICD-10-CM

## 2019-04-25 DIAGNOSIS — M75.21 BICEPS TENDINITIS OF RIGHT UPPER EXTREMITY: Primary | ICD-10-CM

## 2019-04-25 PROCEDURE — 3008F BODY MASS INDEX DOCD: CPT | Mod: CPTII,S$GLB,, | Performed by: PHYSICIAN ASSISTANT

## 2019-04-25 PROCEDURE — 99214 PR OFFICE/OUTPT VISIT, EST, LEVL IV, 30-39 MIN: ICD-10-PCS | Mod: S$GLB,,, | Performed by: PHYSICIAN ASSISTANT

## 2019-04-25 PROCEDURE — 99999 PR PBB SHADOW E&M-EST. PATIENT-LVL IV: CPT | Mod: PBBFAC,,, | Performed by: PHYSICIAN ASSISTANT

## 2019-04-25 PROCEDURE — 99999 PR PBB SHADOW E&M-EST. PATIENT-LVL IV: ICD-10-PCS | Mod: PBBFAC,,, | Performed by: PHYSICIAN ASSISTANT

## 2019-04-25 PROCEDURE — 99214 OFFICE O/P EST MOD 30 MIN: CPT | Mod: S$GLB,,, | Performed by: PHYSICIAN ASSISTANT

## 2019-04-25 PROCEDURE — 3075F PR MOST RECENT SYSTOLIC BLOOD PRESS GE 130-139MM HG: ICD-10-PCS | Mod: CPTII,S$GLB,, | Performed by: PHYSICIAN ASSISTANT

## 2019-04-25 PROCEDURE — 73030 XR SHOULDER COMPLETE 2 OR MORE VIEWS RIGHT: ICD-10-PCS | Mod: 26,RT,, | Performed by: RADIOLOGY

## 2019-04-25 PROCEDURE — 3008F PR BODY MASS INDEX (BMI) DOCUMENTED: ICD-10-PCS | Mod: CPTII,S$GLB,, | Performed by: PHYSICIAN ASSISTANT

## 2019-04-25 PROCEDURE — 73030 X-RAY EXAM OF SHOULDER: CPT | Mod: TC,FY,PO,RT

## 2019-04-25 PROCEDURE — 73030 X-RAY EXAM OF SHOULDER: CPT | Mod: 26,RT,, | Performed by: RADIOLOGY

## 2019-04-25 PROCEDURE — 3075F SYST BP GE 130 - 139MM HG: CPT | Mod: CPTII,S$GLB,, | Performed by: PHYSICIAN ASSISTANT

## 2019-04-25 PROCEDURE — 3079F DIAST BP 80-89 MM HG: CPT | Mod: CPTII,S$GLB,, | Performed by: PHYSICIAN ASSISTANT

## 2019-04-25 PROCEDURE — 3079F PR MOST RECENT DIASTOLIC BLOOD PRESSURE 80-89 MM HG: ICD-10-PCS | Mod: CPTII,S$GLB,, | Performed by: PHYSICIAN ASSISTANT

## 2019-04-25 RX ORDER — MELOXICAM 15 MG/1
15 TABLET ORAL DAILY
Qty: 30 TABLET | Refills: 2 | Status: SHIPPED | OUTPATIENT
Start: 2019-04-25 | End: 2020-05-28

## 2019-04-25 NOTE — PROGRESS NOTES
Subjective:          Chief Complaint: Everette Hodge Jr. is a 59 y.o. male who had concerns including Pain of the Right Shoulder.    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 Pain is located over (points to) anterior shoulder radiating down biceps. He reports that the pain is a 4 /10 sharp, aching, throbbing and radiating pain today and not responding adequately to conservative measures which have included activity modifications, rest, and oral medication. Is affecting ADLs and limiting desired level of activity. Denies numbness, tingling, radiation, and neck pain or radicular symptoms.  Pain is 7 /10 at its worst    Mechanical symptoms: none  Subjective instability: (--)   Worse with bicep curls and bench press, incline and decline  Better with rest.   Nocturnal symptoms: (--)    No previous surgeries or trauma on shoulders            Review of Systems   Constitution: Negative for chills and fever.   HENT: Negative for congestion and sore throat.    Eyes: Negative for discharge and double vision.   Cardiovascular: Negative for chest pain, palpitations and syncope.   Respiratory: Negative for cough and shortness of breath.    Endocrine: Negative for cold intolerance and heat intolerance.   Skin: Negative for dry skin and rash.   Musculoskeletal: Positive for joint pain. Negative for falls, gout, joint swelling, muscle cramps and muscle weakness.   Gastrointestinal: Negative for abdominal pain, nausea and vomiting.   Neurological: Negative for focal weakness, numbness and paresthesias.                   Objective:        General: Everette is well-developed, well-nourished, appears stated age, in no acute distress, alert and oriented to time, place and person.     General    Vitals  reviewed.  Constitutional: He is oriented to person, place, and time. He appears well-developed and well-nourished. No distress.   HENT:   Head: Normocephalic and atraumatic.   Nose: Nose normal.   Eyes: Conjunctivae and EOM are normal. Pupils are equal, round, and reactive to light.   Cardiovascular: Normal rate, regular rhythm and intact distal pulses.    Pulmonary/Chest: Effort normal and breath sounds normal.   Abdominal: Soft. Bowel sounds are normal. He exhibits no distension.   Neurological: He is alert and oriented to person, place, and time. He has normal reflexes.   Psychiatric: He has a normal mood and affect. His behavior is normal. Judgment and thought content normal.         Right Shoulder Exam     Inspection/Observation   Swelling: absent  Bruising: absent  Scars: absent  Deformity: absent  Scapular Winging: absent  Scapular Dyskinesia: negative  Atrophy: absent    Tenderness   The patient is experiencing no tenderness.    Range of Motion   Active abduction: 140   Passive abduction: 140   Extension: 50   Forward Flexion: 160   Forward Elevation: 160Adduction: 30   External Rotation 0 degrees:  40 (+pain) abnormal External Rotation 90 degrees: 90   Internal rotation 0 degrees:  Sacrum (+pain) abnormal   Internal rotation 90 degrees: 90     Tests & Signs   Apprehension: negative  Cross arm: negative  Drop arm: negative  Howe test: negative  Impingement: negative  Lift Off Sign: negative  Belly Press: negative  Active Compression Test (Manderson's Sign): negative  Yergason's Test: positive  Speed's Test: positive  Relocation 90 degrees: negative  Jerk Test: negative    Other   Sensation: normal    Left Shoulder Exam     Inspection/Observation   Swelling: absent  Bruising: absent  Scars: absent  Deformity: absent  Scapular Winging: absent  Scapular Dyskinesia: negative  Atrophy: absent    Tenderness   The patient is experiencing no tenderness.     Range of Motion   Active abduction: 140   Passive  abduction: 140   Extension: 50   Forward Flexion: 160   Forward Elevation: 160Adduction: 30   External Rotation 0 degrees: 70 External Rotation 90 degrees: 90   Internal rotation 0 degrees: T6   Internal rotation 90 degrees: 90     Tests & Signs   Apprehension: negative  Cross arm: negative  Drop arm: negative  Howe test: negative  Impingement: negative  Lift Off Sign: negative  Belly Press: negative  Active Compression test (Dixie's Sign): negative  Yergasons's Test: negative  Speed's Test: negative  Relocation 90 degrees: negative  Jerk Test: negative    Other   Sensation: normal       Muscle Strength   Right Upper Extremity   Shoulder Abduction: 5/5   Shoulder Internal Rotation: 5/5   Shoulder External Rotation: 5/5   Supraspinatus: 5/5/5   Subscapularis: 5/5/5   Biceps: 4/5/5   Left Upper Extremity  Shoulder Abduction: 5/5   Shoulder Internal Rotation: 5/5   Shoulder External Rotation: 5/5   Supraspinatus: 5/5/5   Subscapularis: 5/5/5   Biceps: 5/5/5     Vascular Exam     Right Pulses      Radial:                    2+      Left Pulses      Radial:                    2+      Capillary Refill  Right Hand: normal capillary refill  Left Hand: normal capillary refill    Radiographic Findings 04/25/2019:    Mild DJD.  No fracture or dislocation.  No bone destruction identified.    Xrays of the right shoulder were ordered and reviewed by me today. These findings were discussed and reviewed with the patient.          Assessment:       Encounter Diagnoses   Name Primary?    Biceps tendinitis of right upper extremity Yes    Rotator cuff syndrome of right shoulder     Right shoulder pain, unspecified chronicity           Plan:       We have discussed a variety of treatment options including medications, injections, physical therapy and other alternative treatments. I also explained the indications, risks and benefits of surgery. Patient chooses to proceed with conservative management and physical therapy at this  time.    1. Ice compress to the affected area 2-3x a day for 15-20 minutes as needed for pain management.  2. Mobic 15 mg 1 time daily PRN for pain management. Patient understands to take with food and/or OTC prilosec to decrease GI side effects.  3. Ambulatory referral to physical therapy for periscapular/rotator cuff strengthening.  4. Consider bicep tendon CSI if symptoms worsen. Risks and benefits of this type of injection were discussed with the patient today.  5. RTC to see Delon Walters MD in 4 weeks for follow-up and possible bicep tendon CSI.    All of the patient's questions were answered and the patient will contact us if they have any questions or concerns in the interim.                Patient questionnaires may have been collected.

## 2019-04-29 ENCOUNTER — TELEPHONE (OUTPATIENT)
Dept: SPORTS MEDICINE | Facility: CLINIC | Age: 59
End: 2019-04-29

## 2019-04-29 NOTE — TELEPHONE ENCOUNTER
Called pt and let him know that [facility] doesn't take [employer] insurance, and asked where he would like to go. He stated [facility] just opened a PT in [place], and he would like to go there.

## 2019-04-29 NOTE — TELEPHONE ENCOUNTER
----- Message from Tyler Rodriguez PA-C sent at 4/29/2019 10:54 AM CDT -----  Contact: [facility]      ----- Message -----  From: Chandni Encranacion MA  Sent: 4/26/2019   3:45 PM  To: Tyler Rodriguez PA-C    Please see below.    Thanks!  ----- Message -----  From: Silvana Kaplan  Sent: 4/26/2019   9:13 AM  To: Jennifer Scott Staff    Please call [facility] at [phone] if any questions    Fax# [phone]    The office is currently out of [employer] Insurance network plan    Please forward orders to another facility    Thank you

## 2019-04-30 ENCOUNTER — TELEPHONE (OUTPATIENT)
Dept: SPORTS MEDICINE | Facility: OTHER | Age: 59
End: 2019-04-30

## 2019-04-30 DIAGNOSIS — M75.21 BICEPS TENDINITIS OF RIGHT UPPER EXTREMITY: ICD-10-CM

## 2019-04-30 DIAGNOSIS — M75.101 ROTATOR CUFF SYNDROME OF RIGHT SHOULDER: Primary | ICD-10-CM

## 2019-04-30 DIAGNOSIS — M25.511 RIGHT SHOULDER PAIN, UNSPECIFIED CHRONICITY: ICD-10-CM

## 2019-04-30 NOTE — TELEPHONE ENCOUNTER
PT to [place]. Referral placed today.    ----- Message from Chandni Encarnacion MA sent at 4/29/2019  3:08 PM CDT -----  Contact: [facility]  Called pt and let him know that [facility] doesn't take [employer] insurance, and asked where he would like to go. He stated [facility] just opened a PT in [place], and he would like to go there.    ----- Message -----  From: Tyler Rodriguez PA-C  Sent: 4/29/2019  10:54 AM  To: Chandni Encarnacion MA        ----- Message -----  From: Chandni Encarnacion MA  Sent: 4/26/2019   3:45 PM  To: Tyler Rodriguez PA-C    Please see below.    Thanks!  ----- Message -----  From: Silvana Kaplan  Sent: 4/26/2019   9:13 AM  To: Jennifer Scott Staff    Please call [facility] at [phone] if any questions    Fax# [phone]    The office is currently out of [employer] Insurance network plan    Please forward orders to another facility    Thank you

## 2019-05-09 NOTE — TELEPHONE ENCOUNTER
----- Message from Maria De Jesus Washburn sent at 5/9/2019  1:48 PM CDT -----  Contact: pt  Pt states that his pharmacy has been trying for 2 weeks to get this prescription filled and not gotten a response back,p is completely out. Rx-atorvastatin (LIPITOR) 10 MG tablet-TAKE 1 TABLET(10 MG) BY MOUTH EVERY DAY.[phone]            .      [facility] - [facility] - [address] AT [facility] [address]  [address] [address]  Phone: [phone] Fax: [phone]

## 2019-05-10 RX ORDER — ATORVASTATIN CALCIUM 10 MG/1
TABLET, FILM COATED ORAL
Qty: 90 TABLET | Refills: 1 | Status: SHIPPED | OUTPATIENT
Start: 2019-05-10 | End: 2019-10-08 | Stop reason: SDUPTHER

## 2019-05-14 RX ORDER — LOSARTAN POTASSIUM 50 MG/1
TABLET ORAL
Qty: 90 TABLET | Refills: 1 | Status: SHIPPED | OUTPATIENT
Start: 2019-05-14 | End: 2019-10-09 | Stop reason: SDUPTHER

## 2019-06-04 ENCOUNTER — OFFICE VISIT (OUTPATIENT)
Dept: ORTHOPEDICS | Facility: CLINIC | Age: 59
End: 2019-06-04
Payer: MEDICARE

## 2019-06-04 VITALS — HEIGHT: 72 IN | WEIGHT: 222.44 LBS | BODY MASS INDEX: 30.13 KG/M2

## 2019-06-04 DIAGNOSIS — M75.21 BICEPS TENDONITIS ON RIGHT: Primary | ICD-10-CM

## 2019-06-04 DIAGNOSIS — M75.41 IMPINGEMENT SYNDROME OF RIGHT SHOULDER: ICD-10-CM

## 2019-06-04 PROCEDURE — 3008F BODY MASS INDEX DOCD: CPT | Mod: CPTII,S$GLB,, | Performed by: PHYSICIAN ASSISTANT

## 2019-06-04 PROCEDURE — 20610 LARGE JOINT ASPIRATION/INJECTION: R SUBACROMIAL BURSA: ICD-10-PCS | Mod: RT,S$GLB,, | Performed by: PHYSICIAN ASSISTANT

## 2019-06-04 PROCEDURE — 99999 PR PBB SHADOW E&M-EST. PATIENT-LVL III: ICD-10-PCS | Mod: PBBFAC,,, | Performed by: PHYSICIAN ASSISTANT

## 2019-06-04 PROCEDURE — 20610 DRAIN/INJ JOINT/BURSA W/O US: CPT | Mod: RT,S$GLB,, | Performed by: PHYSICIAN ASSISTANT

## 2019-06-04 PROCEDURE — 99214 OFFICE O/P EST MOD 30 MIN: CPT | Mod: 25,S$GLB,, | Performed by: PHYSICIAN ASSISTANT

## 2019-06-04 PROCEDURE — 99999 PR PBB SHADOW E&M-EST. PATIENT-LVL III: CPT | Mod: PBBFAC,,, | Performed by: PHYSICIAN ASSISTANT

## 2019-06-04 PROCEDURE — 99214 PR OFFICE/OUTPT VISIT, EST, LEVL IV, 30-39 MIN: ICD-10-PCS | Mod: 25,S$GLB,, | Performed by: PHYSICIAN ASSISTANT

## 2019-06-04 PROCEDURE — 3008F PR BODY MASS INDEX (BMI) DOCUMENTED: ICD-10-PCS | Mod: CPTII,S$GLB,, | Performed by: PHYSICIAN ASSISTANT

## 2019-06-04 RX ORDER — TRIAMCINOLONE ACETONIDE 40 MG/ML
40 INJECTION, SUSPENSION INTRA-ARTICULAR; INTRAMUSCULAR
Status: DISCONTINUED | OUTPATIENT
Start: 2019-06-04 | End: 2019-06-04 | Stop reason: HOSPADM

## 2019-06-04 RX ADMIN — TRIAMCINOLONE ACETONIDE 40 MG: 40 INJECTION, SUSPENSION INTRA-ARTICULAR; INTRAMUSCULAR at 05:06

## 2019-06-04 NOTE — PROCEDURES
Large Joint Aspiration/Injection: R subacromial bursa  Date/Time: 6/4/2019 5:09 PM  Performed by: Noreen Laughlin PA-C  Authorized by: Noreen Laughlin PA-C     Consent Done?:  Yes (Verbal)  Timeout: Prior to procedure the correct patient, procedure, and site was verified      Location:  Shoulder  Site:  R subacromial bursa  Prep: Patient was prepped and draped in usual sterile fashion    Needle size:  22 G  Approach:  Posterior  Medications:  40 mg triamcinolone acetonide 40 mg/mL  Patient tolerance:  Patient tolerated the procedure well with no immediate complications

## 2019-06-05 NOTE — PROGRESS NOTES
SUBJECTIVE:     Chief Complaint & History of Present Illness:  Everette Hodge Jr. is a 59 y.o. year old male who presents today with intermittent right shoulder pain that started  ago. 2 months ago.  He states the pain started while exercising.  He is very physically active and exercises with weights regularly.  He is right hand dominant.  The pain is located in the anterior aspect of the shoulder and occasionally radiates down his biceps.  The pain is described as achy.  It may be a 6/10 at its worst but is usually not that severe.  He complaints of a popping sensation over the anterior aspect of his shoulder.  It is aggravated by heavy lifting and laying on his shoulder at night. He is having difficulty sleeping.  He denies numbness, tingling and weakness.  Previous treatments include activity modification, rest, and meloxicam which have provided minimal relief.  He was prescribed physical therapy by another provider but he has been doing home exercises and receiving treatment from a friend.  There is not a history of previous injury or surgery to the shoulder.      Review of patient's allergies indicates:  No Known Allergies      Current Outpatient Medications   Medication Sig Dispense Refill    amLODIPine (NORVASC) 5 MG tablet TAKE 1 TABLET BY MOUTH ONCE DAILY 30 tablet 5    anastrozole (ARIMIDEX) 1 mg Tab Take 0.5 mg by mouth Twice a week .      ascorbic acid, vitamin C, (VITAMIN C) 1000 MG tablet Take 2,000 mg by mouth once daily.      aspirin (ECOTRIN) 81 MG EC tablet Take 81 mg by mouth once daily.      atorvastatin (LIPITOR) 10 MG tablet TAKE 1 TABLET(10 MG) BY MOUTH EVERY DAY 90 tablet 1    fish oil-omega-3 fatty acids 300-1,000 mg capsule Take 500 mg by mouth once daily.      FLUoxetine (PROZAC) 40 MG capsule Take 40 mg by mouth once daily.      LORazepam (ATIVAN) 1 MG tablet Take 1 mg by mouth 3 (three) times daily.      losartan (COZAAR) 50 MG tablet TAKE 1 TABLET EVERY DAY 90 tablet 1     meloxicam (MOBIC) 15 MG tablet Take 1 tablet (15 mg total) by mouth once daily. 30 tablet 2    milk thistle 175 mg tablet Take 1,000 mg by mouth 2 (two) times daily.       multivitamin (ONE DAILY MULTIVITAMIN) per tablet Take 1 tablet by mouth once daily.      naproxen sodium (ANAPROX) 220 MG tablet Take 440 mg by mouth once daily.      testosterone cypionate (DEPOTESTOTERONE CYPIONATE) 200 mg/mL injection Inject into the muscle. Twice a week      traZODone (DESYREL) 150 MG tablet Take 75 mg by mouth every evening.       No current facility-administered medications for this visit.        Past Medical History:   Diagnosis Date    Cataract     Depression     Diabetes mellitus     Diabetes mellitus, type 2     Hypertension     PTSD (post-traumatic stress disorder)        No past surgical history on file.    Vital Signs (Most Recent)  There were no vitals filed for this visit.    Review of Systems:  ROS:  Constitutional: no fever or chills  Eyes: no visual changes  ENT: no nasal congestion or sore throat  Respiratory: no cough or shortness of breath  Cardiovascular: no chest pain or palpitations  Gastrointestinal: no nausea or vomiting, tolerating diet  Genitourinary: no hematuria or dysuria  Integument/Breast: no rash or pruritis  Hematologic/Lymphatic: no easy bruising or lymphadenopathy  Musculoskeletal: no arthralgias or myalgias  Neurological: no seizures or tremors  Behavioral/Psych: no auditory or visual hallucinations  Endocrine: no heat or cold intolerance      OBJECTIVE:     PHYSICAL EXAM:        General: Weight: 100.9 kg (222 lb 7.1 oz) Body mass index is 30.17 kg/m².  Patient is alert, awake and oriented to time, place and person. Mood and affect are appropriate.  Patient does not appear to be in any distress, denies any constitutional symptoms and appears stated age.   HEENT: Pupils are equal and round, sclera are not injected. External examination of ears and nose reveals no abnormalities.  Cranial nerves II-X are grossly intact  Neck:  examination demonstrates painless active range of motion.  Skin: no rashes, abrasions or open wounds on the affected extremity   Resp: No respiratory distress or audible wheezing   Psych:  normal mood and behavior  CV: 2+ pulses, all extremities warm and well perfused   Right Shoulder   Scapular winging negative  Scapular dyskinesia negative  Tenderness: non-tender to palpation  Range of motion is not painful  ROM: Forward elevation 160, IR to L1, ER to 50  Shoulder Strength: 5/5 supraspinatus, 5/5 biceps, 5/5 triceps, 5/5 adduction 5/5 abduction  negative for tenderness over the acromioclavicular joint, positive for impingement sign, sensory exam normal and motor exam normal  Stability tests: normal  Special Tests:    Crossbody test: negative    Neer's positive  Hawkin's positive    Nafisa's negative  Drop arm negative  Belly press negative    Speeds Positive  Yergasons Positive    IMAGING:  X-rays of the right shoulder show no fracture or dislocation.  Mild DJD AC joint.    ASSESSMENT/PLAN:   59 y.o. year old male with right shoulder biceps tendonitis, impingement    Plan: We discussed with the patient at length all the different treatment options available for his right shoulder.  - He was given a steroid injection in his right shoulder, see procedure note  - Continue home exercises and meloxicam as needed  - Consider MRI if no improvement in symptoms.

## 2019-08-11 ENCOUNTER — PATIENT MESSAGE (OUTPATIENT)
Dept: ORTHOPEDICS | Facility: CLINIC | Age: 59
End: 2019-08-11

## 2019-08-29 ENCOUNTER — OFFICE VISIT (OUTPATIENT)
Dept: ORTHOPEDICS | Facility: CLINIC | Age: 59
End: 2019-08-29
Payer: MEDICARE

## 2019-08-29 VITALS
HEIGHT: 72 IN | WEIGHT: 222 LBS | HEART RATE: 96 BPM | DIASTOLIC BLOOD PRESSURE: 66 MMHG | SYSTOLIC BLOOD PRESSURE: 119 MMHG | RESPIRATION RATE: 18 BRPM | BODY MASS INDEX: 30.07 KG/M2 | TEMPERATURE: 98 F

## 2019-08-29 DIAGNOSIS — M75.41 IMPINGEMENT SYNDROME OF RIGHT SHOULDER: ICD-10-CM

## 2019-08-29 DIAGNOSIS — M75.21 BICEPS TENDONITIS ON RIGHT: Primary | ICD-10-CM

## 2019-08-29 PROCEDURE — 99213 OFFICE O/P EST LOW 20 MIN: CPT | Mod: 25,S$GLB,, | Performed by: PHYSICIAN ASSISTANT

## 2019-08-29 PROCEDURE — 3078F PR MOST RECENT DIASTOLIC BLOOD PRESSURE < 80 MM HG: ICD-10-PCS | Mod: CPTII,S$GLB,, | Performed by: PHYSICIAN ASSISTANT

## 2019-08-29 PROCEDURE — 3074F SYST BP LT 130 MM HG: CPT | Mod: CPTII,S$GLB,, | Performed by: PHYSICIAN ASSISTANT

## 2019-08-29 PROCEDURE — 3008F BODY MASS INDEX DOCD: CPT | Mod: CPTII,S$GLB,, | Performed by: PHYSICIAN ASSISTANT

## 2019-08-29 PROCEDURE — 3078F DIAST BP <80 MM HG: CPT | Mod: CPTII,S$GLB,, | Performed by: PHYSICIAN ASSISTANT

## 2019-08-29 PROCEDURE — 99999 PR PBB SHADOW E&M-EST. PATIENT-LVL IV: ICD-10-PCS | Mod: PBBFAC,,, | Performed by: PHYSICIAN ASSISTANT

## 2019-08-29 PROCEDURE — 3008F PR BODY MASS INDEX (BMI) DOCUMENTED: ICD-10-PCS | Mod: CPTII,S$GLB,, | Performed by: PHYSICIAN ASSISTANT

## 2019-08-29 PROCEDURE — 20610 DRAIN/INJ JOINT/BURSA W/O US: CPT | Mod: RT,S$GLB,, | Performed by: PHYSICIAN ASSISTANT

## 2019-08-29 PROCEDURE — 99999 PR PBB SHADOW E&M-EST. PATIENT-LVL IV: CPT | Mod: PBBFAC,,, | Performed by: PHYSICIAN ASSISTANT

## 2019-08-29 PROCEDURE — 20610 LARGE JOINT ASPIRATION/INJECTION: R SUBACROMIAL BURSA: ICD-10-PCS | Mod: RT,S$GLB,, | Performed by: PHYSICIAN ASSISTANT

## 2019-08-29 PROCEDURE — 99213 PR OFFICE/OUTPT VISIT, EST, LEVL III, 20-29 MIN: ICD-10-PCS | Mod: 25,S$GLB,, | Performed by: PHYSICIAN ASSISTANT

## 2019-08-29 PROCEDURE — 3074F PR MOST RECENT SYSTOLIC BLOOD PRESSURE < 130 MM HG: ICD-10-PCS | Mod: CPTII,S$GLB,, | Performed by: PHYSICIAN ASSISTANT

## 2019-08-29 RX ORDER — TRIAMCINOLONE ACETONIDE 40 MG/ML
40 INJECTION, SUSPENSION INTRA-ARTICULAR; INTRAMUSCULAR
Status: DISCONTINUED | OUTPATIENT
Start: 2019-08-29 | End: 2019-08-29 | Stop reason: HOSPADM

## 2019-08-29 RX ADMIN — TRIAMCINOLONE ACETONIDE 40 MG: 40 INJECTION, SUSPENSION INTRA-ARTICULAR; INTRAMUSCULAR at 03:08

## 2019-08-29 NOTE — PROGRESS NOTES
Subjective:      Patient ID: Everette Hodge Jr. is a 59 y.o. male.    Chief Complaint: right shoulder pain  HPI     He is here for a follow up visit for his right shoulder pain.  He is requesting another injection in his right shoulder.  He had temporary relief with the last injection 3 months ago.  Most of his pain is in the front of his shoulder.  His major complaint is that he feels a popping in the front of his shoulder while weight training.  He spends about 3 hours in the gym training daily.  He denies numbness, tingling or weakness.  He has also worked with a physical therapist that he knows for biceps exercises.        Review of Systems   Constitution: Negative for chills and fever.   HENT: Negative for congestion.    Eyes: Negative for double vision and redness.   Cardiovascular: Negative for chest pain and palpitations.   Respiratory: Negative for shortness of breath.    Hematologic/Lymphatic: Does not bruise/bleed easily.   Skin: Negative for rash.   Musculoskeletal: Positive for joint pain.   Gastrointestinal: Negative for abdominal pain, nausea and vomiting.   Neurological: Negative for dizziness and seizures.   Psychiatric/Behavioral: Negative for altered mental status.   Allergic/Immunologic: Negative for persistent infections.         Objective:            General    Vitals reviewed.  Constitutional: He is oriented to person, place, and time. He appears well-developed and well-nourished. No distress.   HENT:   Head: Atraumatic.   Nose: Nose normal.   Eyes: Conjunctivae are normal.   Cardiovascular: Normal rate.    Pulmonary/Chest: Effort normal.   Neurological: He is alert and oriented to person, place, and time.   Psychiatric: He has a normal mood and affect. His behavior is normal.         Right Shoulder Exam     Inspection/Observation   Swelling: absent  Bruising: absent  Deformity: absent  Scapular Dyskinesia: negative    Tenderness   The patient is experiencing no tenderness.    Range of Motion    Forward Flexion: 180   Adduction: normal  External Rotation 0 degrees: 50   Internal rotation 0 degrees: T7     Tests & Signs   Howe test: negative  Impingement: negative  Rotator Cuff Painful Arc/Range: mild    Other   Sensation: normal    Muscle Strength   Right Upper Extremity   Shoulder Abduction: 5/5   Supraspinatus: 5/5/5   Subscapularis: 5/5/5   Biceps: 5/5/5       IMAGING:  No new imaging today        Assessment:       Encounter Diagnoses   Name Primary?    Biceps tendonitis on right Yes    Impingement syndrome of right shoulder           Plan:       Diagnoses and all orders for this visit:    Biceps tendonitis on right    Impingement syndrome of right shoulder    - He has biceps tendonitis with symptoms of biceps subluxation.  He will continue exercises and strengthening.  He was given a steroid injection today.  He will follow up in about one month.  We discussed possibly ordering an MRI.

## 2019-08-29 NOTE — PROCEDURES
Large Joint Aspiration/Injection: R subacromial bursa  Date/Time: 8/29/2019 3:02 PM  Performed by: Noreen Laughlin PA-C  Authorized by: Noreen Laughlin PA-C     Consent Done?:  Yes (Verbal)  Indications:  Pain  Timeout: Prior to procedure the correct patient, procedure, and site was verified    Anesthesia    Anesthetic: topical anesthetic    Location:  Shoulder  Site:  R subacromial bursa  Prep: Patient was prepped and draped in usual sterile fashion    Needle size:  22 G  Approach:  Posterior  Medications:  40 mg triamcinolone acetonide 40 mg/mL  Patient tolerance:  Patient tolerated the procedure well with no immediate complications

## 2019-09-18 ENCOUNTER — PATIENT MESSAGE (OUTPATIENT)
Dept: ORTHOPEDICS | Facility: CLINIC | Age: 59
End: 2019-09-18

## 2019-09-20 ENCOUNTER — TELEPHONE (OUTPATIENT)
Dept: ORTHOPEDICS | Facility: CLINIC | Age: 59
End: 2019-09-20

## 2019-09-20 DIAGNOSIS — M75.21 BICEPS TENDONITIS ON RIGHT: ICD-10-CM

## 2019-09-20 DIAGNOSIS — M75.41 IMPINGEMENT SYNDROME OF RIGHT SHOULDER: Primary | ICD-10-CM

## 2019-09-20 NOTE — TELEPHONE ENCOUNTER
Spoke with pt, scheduled his right shoulder MRI. Pt was pleased and had no further questions.    ----- Message from Noreen Laughlin PA-C sent at 9/20/2019  3:50 PM CDT -----  Can you schedule his left shoulder MRI?  Thanks,  Colleen

## 2019-09-26 DIAGNOSIS — E11.9 TYPE 2 DIABETES MELLITUS WITHOUT COMPLICATION: ICD-10-CM

## 2019-10-09 RX ORDER — ATORVASTATIN CALCIUM 10 MG/1
TABLET, FILM COATED ORAL
Qty: 90 TABLET | Refills: 0 | Status: SHIPPED | OUTPATIENT
Start: 2019-10-09 | End: 2020-01-15

## 2019-10-10 RX ORDER — LOSARTAN POTASSIUM 50 MG/1
TABLET ORAL
Qty: 90 TABLET | Refills: 0 | Status: SHIPPED | OUTPATIENT
Start: 2019-10-10 | End: 2019-12-17 | Stop reason: SDUPTHER

## 2019-10-10 RX ORDER — AMLODIPINE BESYLATE 5 MG/1
TABLET ORAL
Qty: 90 TABLET | Refills: 0 | Status: SHIPPED | OUTPATIENT
Start: 2019-10-10 | End: 2019-12-11 | Stop reason: SDUPTHER

## 2019-10-22 ENCOUNTER — PATIENT MESSAGE (OUTPATIENT)
Dept: PRIMARY CARE CLINIC | Facility: CLINIC | Age: 59
End: 2019-10-22

## 2019-11-02 ENCOUNTER — PATIENT MESSAGE (OUTPATIENT)
Dept: ORTHOPEDICS | Facility: CLINIC | Age: 59
End: 2019-11-02

## 2019-11-04 ENCOUNTER — TELEPHONE (OUTPATIENT)
Dept: ORTHOPEDICS | Facility: CLINIC | Age: 59
End: 2019-11-04

## 2019-11-04 NOTE — TELEPHONE ENCOUNTER
Attempt to contact patient in regards to scheduling an appointment for the pain he's having in his left shoulder. Left message for patient to return call to office. Will try to contact patient again. Thanks

## 2019-11-06 ENCOUNTER — PATIENT OUTREACH (OUTPATIENT)
Dept: ADMINISTRATIVE | Facility: OTHER | Age: 59
End: 2019-11-06

## 2019-11-06 DIAGNOSIS — E11.9 DIABETES MELLITUS WITHOUT COMPLICATION: Primary | ICD-10-CM

## 2019-11-07 ENCOUNTER — HOSPITAL ENCOUNTER (OUTPATIENT)
Dept: RADIOLOGY | Facility: HOSPITAL | Age: 59
Discharge: HOME | End: 2019-11-07
Attending: PHYSICIAN ASSISTANT
Payer: MEDICARE

## 2019-11-07 ENCOUNTER — OFFICE VISIT (OUTPATIENT)
Dept: ORTHOPEDICS | Facility: CLINIC | Age: 59
End: 2019-11-07
Payer: MEDICARE

## 2019-11-07 VITALS
SYSTOLIC BLOOD PRESSURE: 124 MMHG | WEIGHT: 230.44 LBS | HEART RATE: 99 BPM | DIASTOLIC BLOOD PRESSURE: 74 MMHG | HEIGHT: 72 IN | BODY MASS INDEX: 31.21 KG/M2

## 2019-11-07 DIAGNOSIS — M25.512 ACUTE PAIN OF LEFT SHOULDER: ICD-10-CM

## 2019-11-07 DIAGNOSIS — M75.42 IMPINGEMENT SYNDROME OF LEFT SHOULDER: Primary | ICD-10-CM

## 2019-11-07 PROCEDURE — 99214 PR OFFICE/OUTPT VISIT, EST, LEVL IV, 30-39 MIN: ICD-10-PCS | Mod: 25,S$GLB,, | Performed by: PHYSICIAN ASSISTANT

## 2019-11-07 PROCEDURE — 99999 PR PBB SHADOW E&M-EST. PATIENT-LVL IV: ICD-10-PCS | Mod: PBBFAC,,, | Performed by: PHYSICIAN ASSISTANT

## 2019-11-07 PROCEDURE — 73030 XR SHOULDER TRAUMA 3 VIEW LEFT: ICD-10-PCS | Mod: 26,LT,, | Performed by: RADIOLOGY

## 2019-11-07 PROCEDURE — 3074F SYST BP LT 130 MM HG: CPT | Mod: CPTII,S$GLB,, | Performed by: PHYSICIAN ASSISTANT

## 2019-11-07 PROCEDURE — 20610 LARGE JOINT ASPIRATION/INJECTION: L SUBACROMIAL BURSA: ICD-10-PCS | Mod: LT,S$GLB,, | Performed by: PHYSICIAN ASSISTANT

## 2019-11-07 PROCEDURE — 3008F PR BODY MASS INDEX (BMI) DOCUMENTED: ICD-10-PCS | Mod: CPTII,S$GLB,, | Performed by: PHYSICIAN ASSISTANT

## 2019-11-07 PROCEDURE — 99999 PR PBB SHADOW E&M-EST. PATIENT-LVL IV: CPT | Mod: PBBFAC,,, | Performed by: PHYSICIAN ASSISTANT

## 2019-11-07 PROCEDURE — 73030 X-RAY EXAM OF SHOULDER: CPT | Mod: 26,LT,, | Performed by: RADIOLOGY

## 2019-11-07 PROCEDURE — 3078F PR MOST RECENT DIASTOLIC BLOOD PRESSURE < 80 MM HG: ICD-10-PCS | Mod: CPTII,S$GLB,, | Performed by: PHYSICIAN ASSISTANT

## 2019-11-07 PROCEDURE — 3078F DIAST BP <80 MM HG: CPT | Mod: CPTII,S$GLB,, | Performed by: PHYSICIAN ASSISTANT

## 2019-11-07 PROCEDURE — 3008F BODY MASS INDEX DOCD: CPT | Mod: CPTII,S$GLB,, | Performed by: PHYSICIAN ASSISTANT

## 2019-11-07 PROCEDURE — 73030 X-RAY EXAM OF SHOULDER: CPT | Mod: TC,LT

## 2019-11-07 PROCEDURE — 99214 OFFICE O/P EST MOD 30 MIN: CPT | Mod: 25,S$GLB,, | Performed by: PHYSICIAN ASSISTANT

## 2019-11-07 PROCEDURE — 20610 DRAIN/INJ JOINT/BURSA W/O US: CPT | Mod: LT,S$GLB,, | Performed by: PHYSICIAN ASSISTANT

## 2019-11-07 PROCEDURE — 3074F PR MOST RECENT SYSTOLIC BLOOD PRESSURE < 130 MM HG: ICD-10-PCS | Mod: CPTII,S$GLB,, | Performed by: PHYSICIAN ASSISTANT

## 2019-11-07 RX ORDER — TRAMADOL HYDROCHLORIDE 50 MG/1
50 TABLET ORAL EVERY 6 HOURS PRN
Qty: 28 TABLET | Refills: 0 | Status: SHIPPED | OUTPATIENT
Start: 2019-11-07 | End: 2019-11-14

## 2019-11-07 RX ORDER — TRIAMCINOLONE ACETONIDE 40 MG/ML
40 INJECTION, SUSPENSION INTRA-ARTICULAR; INTRAMUSCULAR
Status: DISCONTINUED | OUTPATIENT
Start: 2019-11-07 | End: 2019-11-07 | Stop reason: HOSPADM

## 2019-11-07 RX ADMIN — TRIAMCINOLONE ACETONIDE 40 MG: 40 INJECTION, SUSPENSION INTRA-ARTICULAR; INTRAMUSCULAR at 01:11

## 2019-11-07 NOTE — PROCEDURES
Large Joint Aspiration/Injection: L subacromial bursa  Date/Time: 11/7/2019 1:30 PM  Performed by: Noreen Laughlin PA-C  Authorized by: Noreen Laughlin PA-C     Consent Done?:  Yes (Verbal)  Indications:  Pain  Timeout: Prior to procedure the correct patient, procedure, and site was verified    Anesthesia    Anesthetic: topical anesthetic    Location:  Shoulder  Site:  L subacromial bursa  Prep: Patient was prepped and draped in usual sterile fashion    Approach:  Posterior  Medications:  40 mg triamcinolone acetonide 40 mg/mL  Patient tolerance:  Patient tolerated the procedure well with no immediate complications

## 2019-11-07 NOTE — PROGRESS NOTES
SUBJECTIVE:     Chief Complaint & History of Present Illness:  Everette Hodge Jr. is a 59 y.o. year old male who presents today with constant left shoulder pain.  I have seen him in the past for his right shoulder, which has now resolved.   The left shoulder pain is a new problem that started about 5 weeks ago.  There is not a history of injury.  The pain is located in the  lateral and  upper arm aspect of the shoulder.  The pain is described as achy, 6/10.  It is aggravated by lifting, reaching out the the side, weight-lifting and pushing.   He exercises in the gym about 3 hours per day.  Previous treatments include rest and meloxicam which have provided poor relief.  There is not a history of previous injury or surgery to the shoulder.      Review of patient's allergies indicates:  No Known Allergies      Current Outpatient Medications   Medication Sig Dispense Refill    amLODIPine (NORVASC) 5 MG tablet TAKE 1 TABLET EVERY DAY 90 tablet 0    anastrozole (ARIMIDEX) 1 mg Tab Take 0.5 mg by mouth Twice a week .      ascorbic acid, vitamin C, (VITAMIN C) 1000 MG tablet Take 2,000 mg by mouth once daily.      aspirin (ECOTRIN) 81 MG EC tablet Take 81 mg by mouth once daily.      atorvastatin (LIPITOR) 10 MG tablet TAKE 1 TABLET(10 MG) BY MOUTH EVERY DAY 90 tablet 0    fish oil-omega-3 fatty acids 300-1,000 mg capsule Take 500 mg by mouth once daily.      FLUoxetine (PROZAC) 40 MG capsule Take 40 mg by mouth once daily.      LORazepam (ATIVAN) 1 MG tablet Take 1 mg by mouth 3 (three) times daily.      losartan (COZAAR) 50 MG tablet TAKE 1 TABLET EVERY DAY 90 tablet 0    meloxicam (MOBIC) 15 MG tablet Take 1 tablet (15 mg total) by mouth once daily. 30 tablet 2    milk thistle 175 mg tablet Take 1,000 mg by mouth 2 (two) times daily.       multivitamin (ONE DAILY MULTIVITAMIN) per tablet Take 1 tablet by mouth once daily.      naproxen sodium (ANAPROX) 220 MG tablet Take 440 mg by mouth once daily.       testosterone cypionate (DEPOTESTOTERONE CYPIONATE) 200 mg/mL injection Inject into the muscle. Twice a week      traZODone (DESYREL) 150 MG tablet Take 75 mg by mouth every evening.       No current facility-administered medications for this visit.        Past Medical History:   Diagnosis Date    Cataract     Depression     Diabetes mellitus     Diabetes mellitus, type 2     Hypertension     PTSD (post-traumatic stress disorder)        History reviewed. No pertinent surgical history.    Vital Signs (Most Recent)  Vitals:    11/07/19 1318   BP: 124/74   Pulse: 99       Review of Systems:  ROS:  Constitutional: no fever or chills  Eyes: no visual changes  ENT: no nasal congestion or sore throat  Respiratory: no cough or shortness of breath  Cardiovascular: no chest pain or palpitations  Gastrointestinal: no nausea or vomiting, tolerating diet  Genitourinary: no hematuria or dysuria  Integument/Breast: no rash or pruritis  Hematologic/Lymphatic: no easy bruising or lymphadenopathy  Musculoskeletal: no arthralgias or myalgias  Neurological: no seizures or tremors  Behavioral/Psych: no auditory or visual hallucinations  Endocrine: no heat or cold intolerance      OBJECTIVE:     PHYSICAL EXAM:  Vitals:    11/07/19 1318   BP: 124/74   Pulse: 99           General: Weight: 104.5 kg (230 lb 6.8 oz) Body mass index is 31.25 kg/m².  Patient is alert, awake and oriented to time, place and person. Mood and affect are appropriate.  Patient does not appear to be in any distress, denies any constitutional symptoms and appears stated age.   HEENT: Pupils are equal and round, sclera are not injected. External examination of ears and nose reveals no abnormalities. Cranial nerves II-X are grossly intact  Neck: examination demonstrates painless active range of motion. Spurling's sign is negative  Skin: no rashes, abrasions or open wounds on the affected extremity   Resp: No respiratory distress or audible wheezing   Psych:  normal  mood and behavior  CV: 2+ pulses, all extremities warm and well perfused   Left Shoulder   No swelling or atrophy  Tenderness: none  Range of motion is painful   ROM: forward flexion 180/180, extension 45/45, external rotation 50/50, internal rotation T7/T7  Shoulder Strength: biceps 5/5, triceps 5/5, abduction 5/5, adduction 5/5  positive for impingement sign, sensory exam normal and motor exam normal  Stability tests: normal  Special Tests:    Crossbody test: negative    Neer's positive  Hawkin's positive    Nafisa's positive  Drop arm negative  Belly press negative  Liftoff positive    Speeds positive  Yergasons negative    IMAGING:  X-rays of the left shoulder, personally reviewed by me, demonstrate mild DJD of the AC joint.  Humerus well centered in glenoid.  No fracture or dislocation.    ASSESSMENT/PLAN:   59 y.o. year old male with left shoulder subacromial bursitis    Plan: We discussed with the patient at length all the different treatment options available for his left shoulder  - He was given left shoulder CSI today, see procedure note.  Recommend rest, ice activity modification.  Avoid heavy lifting for the next 2 weeks until symptoms improve.   - Rx tramadol prn for pain. LA  reviewed. Discussed short term use.  Cautioned on use with ativan.  - Follow up if symptoms worsen or fail to improve

## 2019-12-12 ENCOUNTER — PATIENT MESSAGE (OUTPATIENT)
Dept: ADMINISTRATIVE | Facility: HOSPITAL | Age: 59
End: 2019-12-12

## 2019-12-15 RX ORDER — AMLODIPINE BESYLATE 5 MG/1
TABLET ORAL
Qty: 90 TABLET | Refills: 0 | Status: SHIPPED | OUTPATIENT
Start: 2019-12-15 | End: 2020-04-07

## 2019-12-15 NOTE — TELEPHONE ENCOUNTER
Call tell ptg rafa DM and HLP needs lab q 6 m last lab March so lab waws due Sept --needs do lab CBC,CMP,lipid, HgbA1C wsee me 2 weeks later OK 3 mo refills give tiem come in get seen get refills

## 2019-12-16 ENCOUNTER — PATIENT OUTREACH (OUTPATIENT)
Dept: ADMINISTRATIVE | Facility: HOSPITAL | Age: 59
End: 2019-12-16

## 2019-12-23 RX ORDER — LOSARTAN POTASSIUM 50 MG/1
TABLET ORAL
Qty: 90 TABLET | Refills: 0 | Status: SHIPPED | OUTPATIENT
Start: 2019-12-23 | End: 2020-03-28

## 2019-12-24 NOTE — TELEPHONE ENCOUNTER
Call tell pt with DM and HLP needs lab q 6 mo Last lab march -- Needs CBC,CMP,lipid, HGbA1C see me 2 weeks later or come in fasting get lab get seen get refills ane I will call pt results OK 3 mo refills give time come in get seen get additional refills

## 2020-01-15 RX ORDER — ATORVASTATIN CALCIUM 10 MG/1
TABLET, FILM COATED ORAL
Qty: 90 TABLET | Refills: 0 | Status: SHIPPED | OUTPATIENT
Start: 2020-01-15 | End: 2020-04-14

## 2020-01-15 NOTE — TELEPHONE ENCOUNTER
Patient with diabetes and hyperlipidemia Needs lab q.6 months last lab done in March OK 3 months refills give time to come and get lab need CBCs CMP lipid hemoglobin A1c see me 2 weeks later for any additional refills

## 2020-01-16 ENCOUNTER — TELEPHONE (OUTPATIENT)
Dept: PRIMARY CARE CLINIC | Facility: CLINIC | Age: 60
End: 2020-01-16

## 2020-03-28 RX ORDER — LOSARTAN POTASSIUM 50 MG/1
TABLET ORAL
Qty: 90 TABLET | Refills: 0 | Status: SHIPPED | OUTPATIENT
Start: 2020-03-28 | End: 2020-05-28 | Stop reason: SDUPTHER

## 2020-04-07 RX ORDER — AMLODIPINE BESYLATE 5 MG/1
TABLET ORAL
Qty: 90 TABLET | Refills: 0 | Status: SHIPPED | OUTPATIENT
Start: 2020-04-07 | End: 2020-05-28 | Stop reason: SDUPTHER

## 2020-04-08 ENCOUNTER — PATIENT MESSAGE (OUTPATIENT)
Dept: ORTHOPEDICS | Facility: CLINIC | Age: 60
End: 2020-04-08

## 2020-04-08 NOTE — TELEPHONE ENCOUNTER
Call tell pt last lab Jan 2019 last seen March 2019 -- with HLP and DM needs lab q 6 mo CBC,CMP,lipid, HgbA1C T4,TSh U/A stool guaiac if over 2-3 yrs needs Chest Xray and EKG OK 3 mo refills give tome get lab get wseen even if virtually No more refills until seen and lab done

## 2020-04-09 RX ORDER — TRAMADOL HYDROCHLORIDE 50 MG/1
50 TABLET ORAL EVERY 6 HOURS PRN
Qty: 28 TABLET | Refills: 0 | Status: SHIPPED | OUTPATIENT
Start: 2020-04-09 | End: 2020-04-16

## 2020-04-14 RX ORDER — ATORVASTATIN CALCIUM 10 MG/1
TABLET, FILM COATED ORAL
Qty: 90 TABLET | Refills: 0 | Status: SHIPPED | OUTPATIENT
Start: 2020-04-14 | End: 2022-08-30

## 2020-04-14 NOTE — TELEPHONE ENCOUNTER
Above pt with DM and HLP no lab over a year needs lab CBC.CMP,lipid, HGbA1C needs lab OK Also not seen since March 2019 over a year Needsd lab and virytual visit OK 3 mo refills give time get lab and get seen ladst refills without lab and being seen

## 2020-04-30 ENCOUNTER — TELEPHONE (OUTPATIENT)
Dept: PRIMARY CARE CLINIC | Facility: CLINIC | Age: 60
End: 2020-04-30

## 2020-04-30 DIAGNOSIS — E11.9 TYPE 2 DIABETES MELLITUS WITHOUT COMPLICATION, WITHOUT LONG-TERM CURRENT USE OF INSULIN: ICD-10-CM

## 2020-04-30 DIAGNOSIS — E78.5 HYPERLIPIDEMIA, UNSPECIFIED HYPERLIPIDEMIA TYPE: ICD-10-CM

## 2020-04-30 DIAGNOSIS — I10 HYPERTENSION, UNSPECIFIED TYPE: Primary | ICD-10-CM

## 2020-05-02 ENCOUNTER — PATIENT OUTREACH (OUTPATIENT)
Dept: ADMINISTRATIVE | Facility: OTHER | Age: 60
End: 2020-05-02

## 2020-05-02 NOTE — TELEPHONE ENCOUNTER
CRS POD#2 s/p subtotal colectomy    Multiple bouts of emesis yesterday and this morning. Scant stool from rectum. Minimal to no tenderness/pain    /51   Pulse 96   Temp 97.9 °F (36.6 °C)   Resp 18   Ht 5' 8.5\" (1.74 m)   Wt 84 kg (185 lb 3 oz)   SpO2 90%   BMI 27.75 kg/m²     NAD, AAOx3  Abd soft, distended, minimally tender    Plan  Await return of bowel function  Nephrology consult for HARESH. Likely prerenal due to emesis. Hold lasix, spironolactone.   Switch from SHAWNA to [email or web address]  Advised patient on NG tube but he declined Called pt regarding lab order placement, pt verbalized understanding

## 2020-05-04 ENCOUNTER — OFFICE VISIT (OUTPATIENT)
Dept: ORTHOPEDICS | Facility: CLINIC | Age: 60
End: 2020-05-04
Payer: MEDICARE

## 2020-05-04 VITALS — WEIGHT: 230.38 LBS | BODY MASS INDEX: 31.2 KG/M2 | HEIGHT: 72 IN

## 2020-05-04 DIAGNOSIS — M77.8 LEFT SHOULDER TENDINITIS: Primary | ICD-10-CM

## 2020-05-04 PROCEDURE — 20610 PR DRAIN/INJECT LARGE JOINT/BURSA: ICD-10-PCS | Mod: LT,S$GLB,, | Performed by: NURSE PRACTITIONER

## 2020-05-04 PROCEDURE — 3008F PR BODY MASS INDEX (BMI) DOCUMENTED: ICD-10-PCS | Mod: CPTII,S$GLB,, | Performed by: NURSE PRACTITIONER

## 2020-05-04 PROCEDURE — 99213 PR OFFICE/OUTPT VISIT, EST, LEVL III, 20-29 MIN: ICD-10-PCS | Mod: 25,S$GLB,, | Performed by: NURSE PRACTITIONER

## 2020-05-04 PROCEDURE — 99999 PR PBB SHADOW E&M-EST. PATIENT-LVL III: ICD-10-PCS | Mod: PBBFAC,,, | Performed by: NURSE PRACTITIONER

## 2020-05-04 PROCEDURE — 99999 PR PBB SHADOW E&M-EST. PATIENT-LVL III: CPT | Mod: PBBFAC,,, | Performed by: NURSE PRACTITIONER

## 2020-05-04 PROCEDURE — 20610 DRAIN/INJ JOINT/BURSA W/O US: CPT | Mod: LT,S$GLB,, | Performed by: NURSE PRACTITIONER

## 2020-05-04 PROCEDURE — 3008F BODY MASS INDEX DOCD: CPT | Mod: CPTII,S$GLB,, | Performed by: NURSE PRACTITIONER

## 2020-05-04 PROCEDURE — 99213 OFFICE O/P EST LOW 20 MIN: CPT | Mod: 25,S$GLB,, | Performed by: NURSE PRACTITIONER

## 2020-05-04 RX ORDER — TRIAMCINOLONE ACETONIDE 40 MG/ML
40 INJECTION, SUSPENSION INTRA-ARTICULAR; INTRAMUSCULAR
Status: COMPLETED | OUTPATIENT
Start: 2020-05-04 | End: 2020-05-04

## 2020-05-04 RX ADMIN — TRIAMCINOLONE ACETONIDE 40 MG: 40 INJECTION, SUSPENSION INTRA-ARTICULAR; INTRAMUSCULAR at 03:05

## 2020-05-04 NOTE — PROGRESS NOTES
CC: Injections of the Left Shoulder      HPI: Pt with c/o left shoulder pain for the past few weeks. The pain is aching and worse with lateral arm raise and lifting. He denies trauma or injury. He reports that he always has some degree of shoulder pain because he's a weight , but he has had cortisone injections in the past with good relief. He usually tries to wait 6 months, but recently the pain has gotten worse and he would like to repeat an injection today. His last injection was in November. He is retired from the  and owns a gym now. He is a  and generally healthy.    ROS  General: denies fever and chills  Resp: no c/o sob  CVS: no c/o cp  MSK: c/o left shoulder pain which is aching and worse with certain movements    PE  General: AAOx3, pleasant and cooperative  Resp: respirations even and unlabored  MSK: left shoulder exam  Full range of motion  + pain with lateral lifting of the arm  No warmth or erythema   - effusion    Assessment:  Left shoulder tendinitis    Plan:  Cortisone injection left shoulder today  F/u if no improvement    Shoulder Injection Procedure Note    Pre-operative Diagnosis: left shoulder pain    Post-operative Diagnosis: same    Indications: left shoulder pain    Anesthesia: none    Procedure Details     Verbal consent was obtained for the procedure. The injection site was identified and the skin was prepared with alcohol. The left shoulder was injected from a posterior approach with 1 ml of Kenalog and 4 ml Lidocaine under sterile technique using a 22 gauge 1 1/2 inch needle. The needle was removed and the area cleansed and dressed.    Complications:  None; patient tolerated the procedure well.    he was advised to rest the shoulder today, using ice as needed for comfort and swelling. he did receive immediate relief of the shoulder pain. he was told this would be short lived and is secondary to the lidocaine. he may have an increase in discomfort tonight followed  by steady improvement over the next several days. It may take 1-3 weeks following the injection to get the full benefit of the medication.

## 2020-05-05 RX ORDER — AMLODIPINE BESYLATE 5 MG/1
TABLET ORAL
Qty: 90 TABLET | Refills: 0 | OUTPATIENT
Start: 2020-05-05

## 2020-05-05 RX ORDER — LOSARTAN POTASSIUM 50 MG/1
TABLET ORAL
Qty: 90 TABLET | Refills: 0 | OUTPATIENT
Start: 2020-05-05

## 2020-05-06 ENCOUNTER — TELEPHONE (OUTPATIENT)
Dept: PRIMARY CARE CLINIC | Facility: CLINIC | Age: 60
End: 2020-05-06

## 2020-05-06 NOTE — TELEPHONE ENCOUNTER
Contacted patient to let him know that medication had been refused because it's time for a visit. Patient stated that he is currently in [place] and will be out there for another 2 weeks.

## 2020-05-06 NOTE — TELEPHONE ENCOUNTER
Call tell pt last lab march 2019 -- with DM and HLP needs lab q 6 mo last lab over a year ago Needs lab CBC,CMP,lipid Hgb A1C U/A stool guaiac and if over 2-3 yrs add Chest Xray EKG No refills unless pt seen virtually and still needs do lab and be seen No frefills

## 2020-05-14 ENCOUNTER — PATIENT OUTREACH (OUTPATIENT)
Dept: ADMINISTRATIVE | Facility: HOSPITAL | Age: 60
End: 2020-05-14

## 2020-05-14 NOTE — PROGRESS NOTES
Immunizations reviewed. [facility] reviewed. Care Everywhere reviewed. [facility] reviewed w/ no results yielded. Placed call to patient discuss/schedule overdue HM. I spoke w/ patient. Patient advised does Fit Kit every year through [employer] and also had Colonoscopy done within the last 10 years at [facility] which patient reports was normal. Patient scheduled for fasting labs with instructions at [facility] lab on the date and time of patient's request. Fasting instructions given to patient via telephone and patient understanding verbalized. Colonoscopy report requested from [facility] via [facility].  Patient advised plans on scheduling eye exam soon, but has not had an eye exam done in quite a few years. HM updated. Chart review completed.

## 2020-05-14 NOTE — LETTER
AUTHORIZATION FOR RELEASE OF   CONFIDENTIAL INFORMATION    Dear [facility],    We are seeing Everette Hodge Jr., date of birth 1960, in the clinic at [facility]. Dorian Allen MD is the patient's PCP. Everette Hodge Jr. has an outstanding lab/procedure at the time we reviewed his chart. In order to help keep his health information updated, he has authorized us to request the following medical record(s):        (  )  MAMMOGRAM                                      ( X )  COLONOSCOPY      (  )  PAP SMEAR                                          (  )  OUTSIDE LAB RESULTS     (  )  DEXA SCAN                                          (  )  EYE EXAM            (  )  FOOT EXAM                                          (  )  ENTIRE RECORD     (  )  OUTSIDE IMMUNIZATIONS                 (  )  _______________         Please fax records to [facility], Bryan J Bertucci, MD, [phone]     If you have any questions, please contact Melquiades Mtz LPN at [phone].           Patient Name: Everette Hodge Jr.  : 1960  Patient Phone #: [phone]

## 2020-05-28 ENCOUNTER — OFFICE VISIT (OUTPATIENT)
Dept: PRIMARY CARE CLINIC | Facility: CLINIC | Age: 60
End: 2020-05-28
Payer: MEDICARE

## 2020-05-28 VITALS
DIASTOLIC BLOOD PRESSURE: 86 MMHG | WEIGHT: 227 LBS | RESPIRATION RATE: 18 BRPM | OXYGEN SATURATION: 98 % | TEMPERATURE: 98 F | SYSTOLIC BLOOD PRESSURE: 152 MMHG | HEIGHT: 72 IN | BODY MASS INDEX: 30.75 KG/M2 | HEART RATE: 91 BPM

## 2020-05-28 DIAGNOSIS — R79.89 ELEVATED LIVER FUNCTION TESTS: Primary | ICD-10-CM

## 2020-05-28 DIAGNOSIS — F43.10 POSTTRAUMATIC STRESS DISORDER: ICD-10-CM

## 2020-05-28 DIAGNOSIS — F41.9 ANXIETY: ICD-10-CM

## 2020-05-28 DIAGNOSIS — E66.3 OVERWEIGHT: ICD-10-CM

## 2020-05-28 DIAGNOSIS — E11.9 TYPE 2 DIABETES MELLITUS WITHOUT COMPLICATION, WITHOUT LONG-TERM CURRENT USE OF INSULIN: ICD-10-CM

## 2020-05-28 DIAGNOSIS — N20.0 NEPHROLITHIASIS: ICD-10-CM

## 2020-05-28 DIAGNOSIS — F32.A DEPRESSION, UNSPECIFIED DEPRESSION TYPE: ICD-10-CM

## 2020-05-28 DIAGNOSIS — I10 HYPERTENSION, UNSPECIFIED TYPE: ICD-10-CM

## 2020-05-28 DIAGNOSIS — E78.5 HYPERLIPIDEMIA, UNSPECIFIED HYPERLIPIDEMIA TYPE: ICD-10-CM

## 2020-05-28 PROCEDURE — 3077F SYST BP >= 140 MM HG: CPT | Mod: CPTII,S$GLB,, | Performed by: FAMILY MEDICINE

## 2020-05-28 PROCEDURE — 3044F PR MOST RECENT HEMOGLOBIN A1C LEVEL <7.0%: ICD-10-PCS | Mod: CPTII,S$GLB,, | Performed by: FAMILY MEDICINE

## 2020-05-28 PROCEDURE — 3008F BODY MASS INDEX DOCD: CPT | Mod: CPTII,S$GLB,, | Performed by: FAMILY MEDICINE

## 2020-05-28 PROCEDURE — 3079F DIAST BP 80-89 MM HG: CPT | Mod: CPTII,S$GLB,, | Performed by: FAMILY MEDICINE

## 2020-05-28 PROCEDURE — 99999 PR PBB SHADOW E&M-EST. PATIENT-LVL III: CPT | Mod: PBBFAC,,, | Performed by: FAMILY MEDICINE

## 2020-05-28 PROCEDURE — 3077F PR MOST RECENT SYSTOLIC BLOOD PRESSURE >= 140 MM HG: ICD-10-PCS | Mod: CPTII,S$GLB,, | Performed by: FAMILY MEDICINE

## 2020-05-28 PROCEDURE — 99213 OFFICE O/P EST LOW 20 MIN: CPT | Mod: S$GLB,,, | Performed by: FAMILY MEDICINE

## 2020-05-28 PROCEDURE — 3079F PR MOST RECENT DIASTOLIC BLOOD PRESSURE 80-89 MM HG: ICD-10-PCS | Mod: CPTII,S$GLB,, | Performed by: FAMILY MEDICINE

## 2020-05-28 PROCEDURE — 3044F HG A1C LEVEL LT 7.0%: CPT | Mod: CPTII,S$GLB,, | Performed by: FAMILY MEDICINE

## 2020-05-28 PROCEDURE — 3008F PR BODY MASS INDEX (BMI) DOCUMENTED: ICD-10-PCS | Mod: CPTII,S$GLB,, | Performed by: FAMILY MEDICINE

## 2020-05-28 PROCEDURE — 99999 PR PBB SHADOW E&M-EST. PATIENT-LVL III: ICD-10-PCS | Mod: PBBFAC,,, | Performed by: FAMILY MEDICINE

## 2020-05-28 PROCEDURE — 99213 PR OFFICE/OUTPT VISIT, EST, LEVL III, 20-29 MIN: ICD-10-PCS | Mod: S$GLB,,, | Performed by: FAMILY MEDICINE

## 2020-05-28 RX ORDER — AMLODIPINE BESYLATE 5 MG/1
5 TABLET ORAL DAILY
Qty: 90 TABLET | Refills: 1 | Status: SHIPPED | OUTPATIENT
Start: 2020-05-28 | End: 2020-11-23

## 2020-05-28 RX ORDER — TRAZODONE HYDROCHLORIDE 150 MG/1
150 TABLET ORAL NIGHTLY
Qty: 90 TABLET | Refills: 1 | Status: SHIPPED | OUTPATIENT
Start: 2020-05-28 | End: 2021-06-01

## 2020-05-28 RX ORDER — ATORVASTATIN CALCIUM 10 MG/1
10 TABLET, FILM COATED ORAL DAILY
Qty: 90 TABLET | Refills: 1 | Status: CANCELLED | OUTPATIENT
Start: 2020-05-28

## 2020-05-28 RX ORDER — LOSARTAN POTASSIUM 50 MG/1
50 TABLET ORAL DAILY
Qty: 90 TABLET | Refills: 1 | Status: SHIPPED | OUTPATIENT
Start: 2020-05-28 | End: 2020-11-23

## 2020-05-28 NOTE — PROGRESS NOTES
Subjective:       Patient ID: Everette Hodge Jr. is a 60 y.o. male.    Chief Complaint: Follow-up (Patient here to discuss lab results. )    HPI: 59 yo WM -- WBC 14.1 patient on testosterone sodium 130 calcium 10.5 total bilirubin 1.6   cholesterol 242 better of 180 HDL 26 can increase with exercise  better of 100 hemoglobin A1c 5.7 which is X patient about to go off  Testosterone x 8 weeks. No nausea vomiting diarrhea. Achy joints with working out but nothing exceptional. States actually feels very works out 6 days a week 3 hr a day     ROS:  Skin: no psoriasis, eczema, skin cancer  No  Icterus to the skin  HEENT: No headache, ocular pain, blurred vision, diplopia, epistaxis, hoarseness change in voice, thyroid trouble  No scleral echo  Lung: No pneumonia, asthma, Tb, wheezing, SOB  Heart: No chest pain, ankle edema, palpitations, MI, marco antonio murmur, +hypertension, +hyperlipidemia  Abdomen: No nausea, vomiting, diarrhea, constipation, ulcers, hepatitis, gallbladder disease, melena, hematochezia, hematemesis  +elevated liver function test  : no UTI, renal disease, +stones  MS: no fractures, O/A, lupus, rheumatoid, gout  Neuro: No dizziness, LOC, seizures   + diabetes, no anemia, + anxiety, + depression history of posttraumatic stress--getting better--pt states goes gym everyday for 2 hrs, every evening trial run busy with [employer] activities even though retiredEvery month does spartan races around country   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 Elevated liver function tests    2. Type 2 diabetes mellitus without complication, without long-term current use of insulin    3. Overweight    4. Nephrolithiasis    5. Depression, unspecified depression type    6. Anxiety    7. Hypertension, unspecified type    8. Hyperlipidemia, unspecified hyperlipidemia type    9. Posttraumatic stress disorder        Plan:       Elevated liver function tests    Type 2 diabetes mellitus without complication, without long-term current use of insulin    Overweight    Nephrolithiasis    Depression, unspecified depression type    Anxiety    Hypertension, unspecified type    Hyperlipidemia, unspecified hyperlipidemia type    Posttraumatic stress disorder         weight lifting competitively on testosterone-- told needs to be off for  8 weeks-- will recheck liver functions at that time   elevated liver function test   total bilirubin 1.6--- CT scan abdomen pelvis with without oral contrast check for  Any type of liver tumor-- if not better in 2 months will send to a hepatology patient told to avoid alcohol Tylenol NSAIDs Lipitor testosterone take as little medication as possible   leukocytosis possibly secondary to testosterone increase calcium possibly related to testosteron   history nephrolithiasis history of diabetes in the past anxiety depression doing well with psych medications and counseling with psychologist   lab repeat CBCs CMP lipid 2 months before gets back on testosterone if still elevated will send to hepatologist suggest patient not get back on testosterone if liver enzymes still elevated   hypertension blood pressure 152/86 patient on amlodipine Cozaar  Needs to be on low-sodium low-fat diet   lipidemia on Lipitor will hold Lipitor due to elevated liver function test   review of old labs show acute hepatitis panel to AB and C  negative in 2018 patient also gave blood recently in told was negative for hepatitis abdominal ultrasound 2017 showed hepatomegaly with hepatic steatosis so will get CT scan now rule out any type of hepatic tumor from testosterone   health maintenance HIV tetanus eye exam foot exam  Answers for HPI/ROS submitted by the patient on 5/26/2020   activity change: No  unexpected weight change: No  neck pain: No  hearing loss: No  rhinorrhea: No  trouble swallowing: No  eye discharge: No  visual disturbance: No  chest tightness: No  wheezing: No  chest pain: No  palpitations: No  blood in stool: No  constipation: No  vomiting: No  diarrhea: No  polydipsia: No  polyuria: No  difficulty urinating: No  urgency: No  hematuria: No  joint swelling: No  arthralgias: No  headaches: No  weakness: No  confusion: No  dysphoric mood: No

## 2020-06-03 DIAGNOSIS — N40.0 BENIGN PROSTATIC HYPERPLASIA, UNSPECIFIED WHETHER LOWER URINARY TRACT SYMPTOMS PRESENT: Primary | ICD-10-CM

## 2020-06-03 RX ORDER — TAMSULOSIN HYDROCHLORIDE 0.4 MG/1
0.4 CAPSULE ORAL DAILY
Qty: 90 CAPSULE | Refills: 3 | Status: SHIPPED | OUTPATIENT
Start: 2020-06-03 | End: 2021-07-05

## 2020-10-05 ENCOUNTER — PATIENT MESSAGE (OUTPATIENT)
Dept: ADMINISTRATIVE | Facility: HOSPITAL | Age: 60
End: 2020-10-05

## 2020-11-15 ENCOUNTER — PATIENT OUTREACH (OUTPATIENT)
Dept: ADMINISTRATIVE | Facility: OTHER | Age: 60
End: 2020-11-15

## 2020-11-15 DIAGNOSIS — E11.9 TYPE 2 DIABETES MELLITUS WITHOUT COMPLICATION, WITHOUT LONG-TERM CURRENT USE OF INSULIN: Primary | ICD-10-CM

## 2020-11-16 ENCOUNTER — HOSPITAL ENCOUNTER (OUTPATIENT)
Dept: RADIOLOGY | Facility: HOSPITAL | Age: 60
Discharge: HOME | End: 2020-11-16
Attending: PHYSICIAN ASSISTANT
Payer: MEDICARE

## 2020-11-16 ENCOUNTER — OFFICE VISIT (OUTPATIENT)
Dept: ORTHOPEDICS | Facility: CLINIC | Age: 60
End: 2020-11-16
Payer: MEDICARE

## 2020-11-16 VITALS — WEIGHT: 239.31 LBS | HEIGHT: 73 IN | BODY MASS INDEX: 31.72 KG/M2

## 2020-11-16 DIAGNOSIS — M75.41 IMPINGEMENT SYNDROME OF RIGHT SHOULDER: ICD-10-CM

## 2020-11-16 DIAGNOSIS — M77.12 LATERAL EPICONDYLITIS OF LEFT ELBOW: Primary | ICD-10-CM

## 2020-11-16 DIAGNOSIS — M25.522 ELBOW PAIN, LEFT: ICD-10-CM

## 2020-11-16 PROCEDURE — 99214 PR OFFICE/OUTPT VISIT, EST, LEVL IV, 30-39 MIN: ICD-10-PCS | Mod: 25,S$GLB,, | Performed by: PHYSICIAN ASSISTANT

## 2020-11-16 PROCEDURE — 20610 DRAIN/INJ JOINT/BURSA W/O US: CPT | Mod: RT,S$GLB,, | Performed by: PHYSICIAN ASSISTANT

## 2020-11-16 PROCEDURE — 99214 OFFICE O/P EST MOD 30 MIN: CPT | Mod: 25,S$GLB,, | Performed by: PHYSICIAN ASSISTANT

## 2020-11-16 PROCEDURE — 73080 XR ELBOW COMPLETE 3 VIEW LEFT: ICD-10-PCS | Mod: 26,LT,, | Performed by: RADIOLOGY

## 2020-11-16 PROCEDURE — 3008F BODY MASS INDEX DOCD: CPT | Mod: CPTII,S$GLB,, | Performed by: PHYSICIAN ASSISTANT

## 2020-11-16 PROCEDURE — 99999 PR PBB SHADOW E&M-EST. PATIENT-LVL III: CPT | Mod: PBBFAC,,, | Performed by: PHYSICIAN ASSISTANT

## 2020-11-16 PROCEDURE — 73080 X-RAY EXAM OF ELBOW: CPT | Mod: 26,LT,, | Performed by: RADIOLOGY

## 2020-11-16 PROCEDURE — 1125F AMNT PAIN NOTED PAIN PRSNT: CPT | Mod: S$GLB,,, | Performed by: PHYSICIAN ASSISTANT

## 2020-11-16 PROCEDURE — 73080 X-RAY EXAM OF ELBOW: CPT | Mod: TC,LT

## 2020-11-16 PROCEDURE — 1125F PR PAIN SEVERITY QUANTIFIED, PAIN PRESENT: ICD-10-PCS | Mod: S$GLB,,, | Performed by: PHYSICIAN ASSISTANT

## 2020-11-16 PROCEDURE — 20610 LARGE JOINT ASPIRATION/INJECTION: R SUBACROMIAL BURSA: ICD-10-PCS | Mod: RT,S$GLB,, | Performed by: PHYSICIAN ASSISTANT

## 2020-11-16 PROCEDURE — 99999 PR PBB SHADOW E&M-EST. PATIENT-LVL III: ICD-10-PCS | Mod: PBBFAC,,, | Performed by: PHYSICIAN ASSISTANT

## 2020-11-16 PROCEDURE — 3008F PR BODY MASS INDEX (BMI) DOCUMENTED: ICD-10-PCS | Mod: CPTII,S$GLB,, | Performed by: PHYSICIAN ASSISTANT

## 2020-11-16 RX ORDER — MELOXICAM 15 MG/1
15 TABLET ORAL DAILY
Qty: 30 TABLET | Refills: 1 | Status: SHIPPED | OUTPATIENT
Start: 2020-11-16 | End: 2020-11-17

## 2020-11-16 RX ADMIN — TRIAMCINOLONE ACETONIDE 40 MG: 40 INJECTION, SUSPENSION INTRA-ARTICULAR; INTRAMUSCULAR at 03:11

## 2020-11-16 NOTE — PROGRESS NOTES
Chief Complaint & History of Present Illness:  Everette Hodge Jr. is a 60 y.o. year old male presenting with intermittent left elbow pain for the past few weeks.  He denies trauma.  The pain is along the lateral aspect of the elbow.  He notices the pain most when lifting weights, curls and reaching out to pick something up.  He has noticed some swelling intermittently along the lateral aspect.  He has tried ice and compression sleeve with some relief.    He also reports some recurrent pain in his right shoulder.  He has had relief with injections in the past.  Pain is worse with reaching, lifting, overhead activity.  No new injury.  He would like an injection today.     Review of patient's allergies indicates:  No Known Allergies      Current Outpatient Medications   Medication Sig Dispense Refill    amLODIPine (NORVASC) 5 MG tablet Take 1 tablet (5 mg total) by mouth once daily. 90 tablet 1    ascorbic acid, vitamin C, (VITAMIN C) 1000 MG tablet Take 2,000 mg by mouth once daily.      aspirin (ECOTRIN) 81 MG EC tablet Take 81 mg by mouth once daily.      atorvastatin (LIPITOR) 10 MG tablet TAKE 1 TABLET BY MOUTH EVERY DAY 90 tablet 0    LORazepam (ATIVAN) 1 MG tablet Take 1 mg by mouth 3 (three) times daily.      multivitamin (ONE DAILY MULTIVITAMIN) per tablet Take 1 tablet by mouth once daily.      testosterone cypionate (DEPOTESTOTERONE CYPIONATE) 200 mg/mL injection Inject into the muscle. Twice a week      losartan (COZAAR) 50 MG tablet Take 1 tablet (50 mg total) by mouth once daily. (Patient not taking: Reported on 11/16/2020) 90 tablet 1    meloxicam (MOBIC) 15 MG tablet Take 1 tablet (15 mg total) by mouth once daily. 30 tablet 1    tamsulosin (FLOMAX) 0.4 mg Cap Take 1 capsule (0.4 mg total) by mouth once daily. (Patient not taking: Reported on 11/16/2020) 90 capsule 3    traZODone (DESYREL) 150 MG tablet Take 1 tablet (150 mg total) by mouth every evening. 90 tablet 1     No current  "facility-administered medications for this visit.        Past Medical History:   Diagnosis Date    Cataract     Depression     Diabetes mellitus     Diabetes mellitus, type 2     Hypertension     PTSD (post-traumatic stress disorder)        History reviewed. No pertinent surgical history.     Review of Systems:  ROS:  Constitutional: no fever or chills  Eyes: no visual changes  ENT: no nasal congestion or sore throat  Respiratory: no cough or shortness of breath  Cardiovascular: no chest pain or palpitations  Gastrointestinal: no nausea or vomiting, tolerating diet  Genitourinary: no hematuria or dysuria  Integument/Breast: no rash or pruritis  Hematologic/Lymphatic: no easy bruising or lymphadenopathy  Musculoskeletal: no arthralgias or myalgias  Neurological: no seizures or tremors  Behavioral/Psych: no auditory or visual hallucinations  Endocrine: no heat or cold intolerance      OBJECTIVE:     PHYSICAL EXAM:  Height: 6' 1" (185.4 cm) Weight: 108.6 kg (239 lb 5 oz), General Appearance: Well nourished, well developed, in no acute distress.  CV: 2+ UE/LE distal pulses bilaterally.  Resp:  Respirations equal and unlabored.  Neurological: Mood & affect are normal.  GI: Abdomen soft and non-tender.  left  Elbow:   Skin intact, no erythema or ecchymosis  ROM 0-120  No effusion  TTP lateral epicondyle  Pain worse with resisted wrist extension and supination  + long finger test  ltsi C5-T1  + epl, io, fds, fdp   2+ RP     Right shoulder  Skin intact  No erythema or effusion  FROM  5/5 RTC strength testing  + Neer's  + pabon  + Nafisa's    RADIOGRAPHS:  X-rays of the left elbow, personally reviewed by me, demonstrate well maintained joint space.  No fracture or dislocation.    ASSESSMENT/PLAN:     60 year old male with left elbow lateral epicondylitis, right shoulder subacromial bursitis    - Left elbow lateral epicondylitis- recommend rest, ice daily, avoid heavy lifting, brace/sleeve, and short course of NSAIDS  - " Prescription for meloxicam  - Right shoulder CSI, see procedure note- he has had relief in the past

## 2020-11-16 NOTE — PROGRESS NOTES
Requested updates within Care Everywhere.  Patient's chart was reviewed for overdue MARK ANTHONY topics.  Immunizations reconciled.    Orders placed:Diabetic eye cam

## 2020-11-16 NOTE — PROCEDURES
Large Joint Aspiration/Injection: R subacromial bursa    Date/Time: 11/16/2020 3:30 PM  Performed by: Noreen Laughlin PA-C  Authorized by: Noreen Laughlin PA-C     Consent Done?:  Yes (Verbal)  Indications:  Pain  Prep: patient was prepped and draped in usual sterile fashion    Local anesthetic:  Topical anesthetic    Details:  Needle Size:  22 G  Approach:  Posterior  Location:  Shoulder  Site:  R subacromial bursa  Medications:  40 mg triamcinolone acetonide 40 mg/mL  Patient tolerance:  Patient tolerated the procedure well with no immediate complications

## 2020-11-17 RX ORDER — TRIAMCINOLONE ACETONIDE 40 MG/ML
40 INJECTION, SUSPENSION INTRA-ARTICULAR; INTRAMUSCULAR
Status: DISCONTINUED | OUTPATIENT
Start: 2020-11-16 | End: 2020-11-17 | Stop reason: HOSPADM

## 2020-11-23 RX ORDER — AMLODIPINE BESYLATE 5 MG/1
TABLET ORAL
Qty: 90 TABLET | Refills: 1 | Status: SHIPPED | OUTPATIENT
Start: 2020-11-23 | End: 2021-04-09

## 2020-11-23 RX ORDER — LOSARTAN POTASSIUM 50 MG/1
TABLET ORAL
Qty: 90 TABLET | Refills: 0 | Status: SHIPPED | OUTPATIENT
Start: 2020-11-23 | End: 2024-02-23

## 2020-11-24 NOTE — TELEPHONE ENCOUNTER
Call pt tell with DM and HLP needs lab q6 mo Lab due CC,CMP,lipid HGbA1C OK 3 mo refillsa give time get lab get seen get refills

## 2020-12-16 DIAGNOSIS — E11.9 TYPE 2 DIABETES MELLITUS WITHOUT COMPLICATION: ICD-10-CM

## 2021-01-04 ENCOUNTER — PATIENT MESSAGE (OUTPATIENT)
Dept: ADMINISTRATIVE | Facility: HOSPITAL | Age: 61
End: 2021-01-04

## 2021-02-22 ENCOUNTER — PATIENT MESSAGE (OUTPATIENT)
Dept: PRIMARY CARE CLINIC | Facility: CLINIC | Age: 61
End: 2021-02-22

## 2021-03-05 ENCOUNTER — IMMUNIZATION (OUTPATIENT)
Dept: PRIMARY CARE CLINIC | Facility: CLINIC | Age: 61
End: 2021-03-05

## 2021-03-05 DIAGNOSIS — Z23 NEED FOR VACCINATION: Primary | ICD-10-CM

## 2021-03-05 PROCEDURE — 0031A PR IMMUNIZ ADMIN, SARS-COV-2 COVID-19 VACC, 5X10VP/0.5ML: CPT | Mod: CV19,S$GLB,, | Performed by: INTERNAL MEDICINE

## 2021-03-05 PROCEDURE — 0031A PR IMMUNIZ ADMIN, SARS-COV-2 COVID-19 VACC, 5X10VP/0.5ML: ICD-10-PCS | Mod: CV19,S$GLB,, | Performed by: INTERNAL MEDICINE

## 2021-03-05 PROCEDURE — 91303 PR SARSCOV2 VAC AD26 .5ML IM: CPT | Mod: S$GLB,,, | Performed by: INTERNAL MEDICINE

## 2021-03-05 PROCEDURE — 91303 PR SARSCOV2 VAC AD26 .5ML IM: ICD-10-PCS | Mod: S$GLB,,, | Performed by: INTERNAL MEDICINE

## 2021-06-01 DIAGNOSIS — E11.9 TYPE 2 DIABETES MELLITUS WITHOUT COMPLICATION, WITHOUT LONG-TERM CURRENT USE OF INSULIN: Primary | ICD-10-CM

## 2021-06-01 RX ORDER — TRAZODONE HYDROCHLORIDE 150 MG/1
TABLET ORAL
Qty: 90 TABLET | Refills: 1 | Status: SHIPPED | OUTPATIENT
Start: 2021-06-01 | End: 2022-02-24

## 2021-06-04 ENCOUNTER — OFFICE VISIT (OUTPATIENT)
Dept: ORTHOPEDICS | Facility: CLINIC | Age: 61
End: 2021-06-04
Payer: MEDICARE

## 2021-06-04 ENCOUNTER — HOSPITAL ENCOUNTER (OUTPATIENT)
Dept: RADIOLOGY | Facility: HOSPITAL | Age: 61
Discharge: HOME | End: 2021-06-04
Attending: NURSE PRACTITIONER
Payer: MEDICARE

## 2021-06-04 DIAGNOSIS — M25.511 RIGHT SHOULDER PAIN, UNSPECIFIED CHRONICITY: ICD-10-CM

## 2021-06-04 DIAGNOSIS — M25.511 RIGHT SHOULDER PAIN, UNSPECIFIED CHRONICITY: Primary | ICD-10-CM

## 2021-06-04 PROCEDURE — 1125F PR PAIN SEVERITY QUANTIFIED, PAIN PRESENT: ICD-10-PCS | Mod: S$GLB,,, | Performed by: NURSE PRACTITIONER

## 2021-06-04 PROCEDURE — 99213 OFFICE O/P EST LOW 20 MIN: CPT | Mod: S$GLB,,, | Performed by: NURSE PRACTITIONER

## 2021-06-04 PROCEDURE — 1125F AMNT PAIN NOTED PAIN PRSNT: CPT | Mod: S$GLB,,, | Performed by: NURSE PRACTITIONER

## 2021-06-04 PROCEDURE — 73030 XR SHOULDER TRAUMA 3 VIEW RIGHT: ICD-10-PCS | Mod: 26,RT,, | Performed by: RADIOLOGY

## 2021-06-04 PROCEDURE — 99999 PR PBB SHADOW E&M-EST. PATIENT-LVL III: ICD-10-PCS | Mod: PBBFAC,,, | Performed by: NURSE PRACTITIONER

## 2021-06-04 PROCEDURE — 99213 PR OFFICE/OUTPT VISIT, EST, LEVL III, 20-29 MIN: ICD-10-PCS | Mod: S$GLB,,, | Performed by: NURSE PRACTITIONER

## 2021-06-04 PROCEDURE — 73030 X-RAY EXAM OF SHOULDER: CPT | Mod: 26,RT,, | Performed by: RADIOLOGY

## 2021-06-04 PROCEDURE — 73030 X-RAY EXAM OF SHOULDER: CPT | Mod: TC,RT

## 2021-06-04 PROCEDURE — 99999 PR PBB SHADOW E&M-EST. PATIENT-LVL III: CPT | Mod: PBBFAC,,, | Performed by: NURSE PRACTITIONER

## 2021-06-04 RX ORDER — METHOCARBAMOL 750 MG/1
750 TABLET, FILM COATED ORAL 4 TIMES DAILY
Qty: 40 TABLET | Refills: 0 | Status: SHIPPED | OUTPATIENT
Start: 2021-06-04 | End: 2021-06-14

## 2021-06-04 RX ORDER — METHYLPREDNISOLONE 4 MG/1
TABLET ORAL
Qty: 1 PACKAGE | Refills: 0 | Status: SHIPPED | OUTPATIENT
Start: 2021-06-04 | End: 2021-06-25

## 2021-07-05 RX ORDER — TAMSULOSIN HYDROCHLORIDE 0.4 MG/1
CAPSULE ORAL
Qty: 90 CAPSULE | Refills: 0 | Status: SHIPPED | OUTPATIENT
Start: 2021-07-05 | End: 2022-08-30

## 2021-07-06 ENCOUNTER — PATIENT MESSAGE (OUTPATIENT)
Dept: ADMINISTRATIVE | Facility: HOSPITAL | Age: 61
End: 2021-07-06

## 2021-07-14 ENCOUNTER — OFFICE VISIT (OUTPATIENT)
Dept: ORTHOPEDICS | Facility: CLINIC | Age: 61
End: 2021-07-14
Payer: MEDICARE

## 2021-07-14 DIAGNOSIS — E11.9 TYPE 2 DIABETES MELLITUS WITHOUT COMPLICATION, WITHOUT LONG-TERM CURRENT USE OF INSULIN: Primary | ICD-10-CM

## 2021-07-14 DIAGNOSIS — M77.8 RIGHT SHOULDER TENDINITIS: Primary | ICD-10-CM

## 2021-07-14 PROCEDURE — 20610 PR DRAIN/INJECT LARGE JOINT/BURSA: ICD-10-PCS | Mod: RT,S$GLB,, | Performed by: NURSE PRACTITIONER

## 2021-07-14 PROCEDURE — 99213 OFFICE O/P EST LOW 20 MIN: CPT | Mod: 25,S$GLB,, | Performed by: NURSE PRACTITIONER

## 2021-07-14 PROCEDURE — 20610 DRAIN/INJ JOINT/BURSA W/O US: CPT | Mod: RT,S$GLB,, | Performed by: NURSE PRACTITIONER

## 2021-07-14 PROCEDURE — 99999 PR PBB SHADOW E&M-EST. PATIENT-LVL II: ICD-10-PCS | Mod: PBBFAC,,, | Performed by: NURSE PRACTITIONER

## 2021-07-14 PROCEDURE — 99999 PR PBB SHADOW E&M-EST. PATIENT-LVL II: CPT | Mod: PBBFAC,,, | Performed by: NURSE PRACTITIONER

## 2021-07-14 PROCEDURE — 99213 PR OFFICE/OUTPT VISIT, EST, LEVL III, 20-29 MIN: ICD-10-PCS | Mod: 25,S$GLB,, | Performed by: NURSE PRACTITIONER

## 2021-07-14 RX ORDER — TRIAMCINOLONE ACETONIDE 40 MG/ML
40 INJECTION, SUSPENSION INTRA-ARTICULAR; INTRAMUSCULAR
Status: COMPLETED | OUTPATIENT
Start: 2021-07-14 | End: 2021-07-14

## 2021-07-14 RX ORDER — AMLODIPINE BESYLATE 5 MG/1
TABLET ORAL
Qty: 90 TABLET | Refills: 0 | Status: SHIPPED | OUTPATIENT
Start: 2021-07-14 | End: 2021-09-17 | Stop reason: SDUPTHER

## 2021-07-14 RX ADMIN — TRIAMCINOLONE ACETONIDE 40 MG: 40 INJECTION, SUSPENSION INTRA-ARTICULAR; INTRAMUSCULAR at 02:07

## 2021-07-20 ENCOUNTER — OFFICE VISIT (OUTPATIENT)
Dept: PRIMARY CARE CLINIC | Facility: CLINIC | Age: 61
End: 2021-07-20
Payer: MEDICARE

## 2021-07-20 VITALS
OXYGEN SATURATION: 96 % | DIASTOLIC BLOOD PRESSURE: 82 MMHG | BODY MASS INDEX: 31.13 KG/M2 | HEIGHT: 73 IN | WEIGHT: 234.88 LBS | HEART RATE: 85 BPM | SYSTOLIC BLOOD PRESSURE: 138 MMHG | RESPIRATION RATE: 18 BRPM

## 2021-07-20 DIAGNOSIS — Z86.39 HISTORY OF HYPERGLYCEMIA: ICD-10-CM

## 2021-07-20 DIAGNOSIS — Z87.442 HISTORY OF NEPHROLITHIASIS: ICD-10-CM

## 2021-07-20 DIAGNOSIS — E78.5 HYPERLIPIDEMIA, UNSPECIFIED HYPERLIPIDEMIA TYPE: ICD-10-CM

## 2021-07-20 DIAGNOSIS — Z11.4 ENCOUNTER FOR SCREENING FOR HIV: ICD-10-CM

## 2021-07-20 DIAGNOSIS — I10 HYPERTENSION, UNSPECIFIED TYPE: ICD-10-CM

## 2021-07-20 DIAGNOSIS — F41.9 ANXIETY: ICD-10-CM

## 2021-07-20 DIAGNOSIS — F32.A DEPRESSION, UNSPECIFIED DEPRESSION TYPE: ICD-10-CM

## 2021-07-20 DIAGNOSIS — F43.10 POSTTRAUMATIC STRESS DISORDER: ICD-10-CM

## 2021-07-20 DIAGNOSIS — R79.89 ELEVATED LIVER FUNCTION TESTS: Primary | ICD-10-CM

## 2021-07-20 PROBLEM — N20.0 NEPHROLITHIASIS: Status: RESOLVED | Noted: 2017-11-14 | Resolved: 2021-07-20

## 2021-07-20 PROBLEM — E11.9 TYPE 2 DIABETES MELLITUS WITHOUT COMPLICATION, WITHOUT LONG-TERM CURRENT USE OF INSULIN: Status: RESOLVED | Noted: 2017-12-12 | Resolved: 2021-07-20

## 2021-07-20 PROCEDURE — 3008F PR BODY MASS INDEX (BMI) DOCUMENTED: ICD-10-PCS | Mod: CPTII,S$GLB,, | Performed by: FAMILY MEDICINE

## 2021-07-20 PROCEDURE — 82043 UR ALBUMIN QUANTITATIVE: CPT | Performed by: FAMILY MEDICINE

## 2021-07-20 PROCEDURE — 90714 TD VACC NO PRESV 7 YRS+ IM: CPT | Mod: S$GLB,,, | Performed by: FAMILY MEDICINE

## 2021-07-20 PROCEDURE — 3079F DIAST BP 80-89 MM HG: CPT | Mod: CPTII,S$GLB,, | Performed by: FAMILY MEDICINE

## 2021-07-20 PROCEDURE — 90471 TD VACCINE GREATER THAN OR EQUAL TO 7YO PRESERVATIVE FREE IM: ICD-10-PCS | Mod: S$GLB,,, | Performed by: FAMILY MEDICINE

## 2021-07-20 PROCEDURE — 3079F PR MOST RECENT DIASTOLIC BLOOD PRESSURE 80-89 MM HG: ICD-10-PCS | Mod: CPTII,S$GLB,, | Performed by: FAMILY MEDICINE

## 2021-07-20 PROCEDURE — 90471 IMMUNIZATION ADMIN: CPT | Mod: S$GLB,,, | Performed by: FAMILY MEDICINE

## 2021-07-20 PROCEDURE — 99999 PR PBB SHADOW E&M-EST. PATIENT-LVL IV: CPT | Mod: PBBFAC,,, | Performed by: FAMILY MEDICINE

## 2021-07-20 PROCEDURE — 1159F MED LIST DOCD IN RCRD: CPT | Mod: CPTII,S$GLB,, | Performed by: FAMILY MEDICINE

## 2021-07-20 PROCEDURE — 3008F BODY MASS INDEX DOCD: CPT | Mod: CPTII,S$GLB,, | Performed by: FAMILY MEDICINE

## 2021-07-20 PROCEDURE — 99999 PR PBB SHADOW E&M-EST. PATIENT-LVL IV: ICD-10-PCS | Mod: PBBFAC,,, | Performed by: FAMILY MEDICINE

## 2021-07-20 PROCEDURE — 90714 TD VACCINE GREATER THAN OR EQUAL TO 7YO PRESERVATIVE FREE IM: ICD-10-PCS | Mod: S$GLB,,, | Performed by: FAMILY MEDICINE

## 2021-07-20 PROCEDURE — 1159F PR MEDICATION LIST DOCUMENTED IN MEDICAL RECORD: ICD-10-PCS | Mod: CPTII,S$GLB,, | Performed by: FAMILY MEDICINE

## 2021-07-20 PROCEDURE — 3075F SYST BP GE 130 - 139MM HG: CPT | Mod: CPTII,S$GLB,, | Performed by: FAMILY MEDICINE

## 2021-07-20 PROCEDURE — 3075F PR MOST RECENT SYSTOLIC BLOOD PRESS GE 130-139MM HG: ICD-10-PCS | Mod: CPTII,S$GLB,, | Performed by: FAMILY MEDICINE

## 2021-07-20 PROCEDURE — 99214 OFFICE O/P EST MOD 30 MIN: CPT | Mod: 25,S$GLB,, | Performed by: FAMILY MEDICINE

## 2021-07-20 PROCEDURE — 82570 ASSAY OF URINE CREATININE: CPT | Performed by: FAMILY MEDICINE

## 2021-07-20 PROCEDURE — 99214 PR OFFICE/OUTPT VISIT, EST, LEVL IV, 30-39 MIN: ICD-10-PCS | Mod: 25,S$GLB,, | Performed by: FAMILY MEDICINE

## 2021-07-20 PROCEDURE — 1126F AMNT PAIN NOTED NONE PRSNT: CPT | Mod: CPTII,S$GLB,, | Performed by: FAMILY MEDICINE

## 2021-07-20 PROCEDURE — 1126F PR PAIN SEVERITY QUANTIFIED, NO PAIN PRESENT: ICD-10-PCS | Mod: CPTII,S$GLB,, | Performed by: FAMILY MEDICINE

## 2021-07-21 ENCOUNTER — PATIENT MESSAGE (OUTPATIENT)
Dept: PRIMARY CARE CLINIC | Facility: CLINIC | Age: 61
End: 2021-07-21

## 2021-07-21 LAB
ALBUMIN/CREAT UR: 45.4 UG/MG (ref 0–30)
CREAT UR-MCNC: 163 MG/DL (ref 23–375)
MICROALBUMIN UR DL<=1MG/L-MCNC: 74 UG/ML

## 2021-08-24 NOTE — PROGRESS NOTES
Problem: Pain  Goal: #Acceptable pain level achieved/maintained at rest using NRS/Faces  Description: This goal is used for patients who can self-report.  Acceptable means the level is at or below the identified comfort/function goal.  Outcome: Outcome Met, Continue evaluating goal progress toward completion  Goal: # Verbalizes understanding of pain management  Description: Documented in Patient Education Activity  Outcome: Outcome Met, Continue evaluating goal progress toward completion     Problem: At Risk for Falls  Goal: # Patient does not fall  Outcome: Outcome Met, Continue evaluating goal progress toward completion  Goal: # Takes action to control fall-related risks  Outcome: Outcome Met, Continue evaluating goal progress toward completion  Goal: # Verbalizes understanding of fall risk/precautions  Description: Document education using the patient education activity  Outcome: Outcome Met, Continue evaluating goal progress toward completion      Chief Complaint   Patient presents with    PCP     Alejandro    Ingrown Toenail     f/u            S:   58 y.o. male returns for follow up s/p ingrown toenail procedure - bilateral hallux .  Patient is healing well, no complaints. Has been keeping the toe covered as instructed. Patient has no pain today. Patient denies constitutional symptoms.     O:   Vitals:    03/07/18 0840   BP: 118/83   Pulse: 62   Weight: 103.4 kg (228 lb)   Height: 6' (1.829 m)      S/p  bilateral hallux ingrown toenail matrixectomy. minimal erythema;  no ascending cellulitis. minimal swelling. No drainage.  +scabbing.   The site is healing well. No signs of infection. Pedal pulses are palpable. Range of motion intact. no tenderness to palpation.     A/P:   1. Follow-up exam after treatment     2. Ingrown nail          S/p bilateral hallux  ingrown toenail chemical matrixectomy. Patient is healing well.  Return to regular activities as tolerated.   Call or return to clinic prn if these symptoms worsen or fail to improve as anticipated.

## 2021-08-26 ENCOUNTER — PATIENT MESSAGE (OUTPATIENT)
Dept: ORTHOPEDICS | Facility: CLINIC | Age: 61
End: 2021-08-26

## 2021-08-26 DIAGNOSIS — M62.838 MUSCLE SPASM: Primary | ICD-10-CM

## 2021-08-26 RX ORDER — METHOCARBAMOL 500 MG/1
500 TABLET, FILM COATED ORAL 3 TIMES DAILY PRN
Qty: 30 TABLET | Refills: 0 | Status: SHIPPED | OUTPATIENT
Start: 2021-08-26 | End: 2021-09-05

## 2021-09-17 ENCOUNTER — OFFICE VISIT (OUTPATIENT)
Dept: PRIMARY CARE CLINIC | Facility: CLINIC | Age: 61
End: 2021-09-17
Payer: MEDICARE

## 2021-09-17 VITALS
HEIGHT: 73 IN | HEART RATE: 85 BPM | WEIGHT: 240 LBS | SYSTOLIC BLOOD PRESSURE: 136 MMHG | BODY MASS INDEX: 31.81 KG/M2 | DIASTOLIC BLOOD PRESSURE: 70 MMHG | RESPIRATION RATE: 18 BRPM | TEMPERATURE: 98 F | OXYGEN SATURATION: 97 %

## 2021-09-17 DIAGNOSIS — Z09 HOSPITAL DISCHARGE FOLLOW-UP: Primary | ICD-10-CM

## 2021-09-17 DIAGNOSIS — Z12.5 ENCOUNTER FOR SCREENING FOR MALIGNANT NEOPLASM OF PROSTATE: ICD-10-CM

## 2021-09-17 DIAGNOSIS — E29.1 HYPOGONADISM IN MALE: ICD-10-CM

## 2021-09-17 PROCEDURE — 3066F NEPHROPATHY DOC TX: CPT | Mod: CPTII,S$GLB,, | Performed by: NURSE PRACTITIONER

## 2021-09-17 PROCEDURE — 3078F DIAST BP <80 MM HG: CPT | Mod: CPTII,S$GLB,, | Performed by: NURSE PRACTITIONER

## 2021-09-17 PROCEDURE — 99999 PR PBB SHADOW E&M-EST. PATIENT-LVL III: CPT | Mod: PBBFAC,,, | Performed by: NURSE PRACTITIONER

## 2021-09-17 PROCEDURE — 99213 OFFICE O/P EST LOW 20 MIN: CPT | Mod: S$GLB,,, | Performed by: NURSE PRACTITIONER

## 2021-09-17 PROCEDURE — 99213 PR OFFICE/OUTPT VISIT, EST, LEVL III, 20-29 MIN: ICD-10-PCS | Mod: S$GLB,,, | Performed by: NURSE PRACTITIONER

## 2021-09-17 PROCEDURE — 3044F PR MOST RECENT HEMOGLOBIN A1C LEVEL <7.0%: ICD-10-PCS | Mod: CPTII,S$GLB,, | Performed by: NURSE PRACTITIONER

## 2021-09-17 PROCEDURE — 99999 PR PBB SHADOW E&M-EST. PATIENT-LVL III: ICD-10-PCS | Mod: PBBFAC,,, | Performed by: NURSE PRACTITIONER

## 2021-09-17 PROCEDURE — 3060F PR POS MICROALBUMINURIA RESULT DOCUMENTED/REVIEW: ICD-10-PCS | Mod: CPTII,S$GLB,, | Performed by: NURSE PRACTITIONER

## 2021-09-17 PROCEDURE — 3060F POS MICROALBUMINURIA REV: CPT | Mod: CPTII,S$GLB,, | Performed by: NURSE PRACTITIONER

## 2021-09-17 PROCEDURE — 3044F HG A1C LEVEL LT 7.0%: CPT | Mod: CPTII,S$GLB,, | Performed by: NURSE PRACTITIONER

## 2021-09-17 PROCEDURE — 3066F PR DOCUMENTATION OF TREATMENT FOR NEPHROPATHY: ICD-10-PCS | Mod: CPTII,S$GLB,, | Performed by: NURSE PRACTITIONER

## 2021-09-17 PROCEDURE — 3075F SYST BP GE 130 - 139MM HG: CPT | Mod: CPTII,S$GLB,, | Performed by: NURSE PRACTITIONER

## 2021-09-17 PROCEDURE — 1159F PR MEDICATION LIST DOCUMENTED IN MEDICAL RECORD: ICD-10-PCS | Mod: CPTII,S$GLB,, | Performed by: NURSE PRACTITIONER

## 2021-09-17 PROCEDURE — 3008F PR BODY MASS INDEX (BMI) DOCUMENTED: ICD-10-PCS | Mod: CPTII,S$GLB,, | Performed by: NURSE PRACTITIONER

## 2021-09-17 PROCEDURE — 3075F PR MOST RECENT SYSTOLIC BLOOD PRESS GE 130-139MM HG: ICD-10-PCS | Mod: CPTII,S$GLB,, | Performed by: NURSE PRACTITIONER

## 2021-09-17 PROCEDURE — 3078F PR MOST RECENT DIASTOLIC BLOOD PRESSURE < 80 MM HG: ICD-10-PCS | Mod: CPTII,S$GLB,, | Performed by: NURSE PRACTITIONER

## 2021-09-17 PROCEDURE — 3008F BODY MASS INDEX DOCD: CPT | Mod: CPTII,S$GLB,, | Performed by: NURSE PRACTITIONER

## 2021-09-17 PROCEDURE — 1160F RVW MEDS BY RX/DR IN RCRD: CPT | Mod: CPTII,S$GLB,, | Performed by: NURSE PRACTITIONER

## 2021-09-17 PROCEDURE — 1160F PR REVIEW ALL MEDS BY PRESCRIBER/CLIN PHARMACIST DOCUMENTED: ICD-10-PCS | Mod: CPTII,S$GLB,, | Performed by: NURSE PRACTITIONER

## 2021-09-17 PROCEDURE — 1159F MED LIST DOCD IN RCRD: CPT | Mod: CPTII,S$GLB,, | Performed by: NURSE PRACTITIONER

## 2021-09-17 RX ORDER — AMLODIPINE BESYLATE 5 MG/1
5 TABLET ORAL DAILY
Qty: 90 TABLET | Refills: 3 | Status: SHIPPED | OUTPATIENT
Start: 2021-09-17 | End: 2022-07-27

## 2021-10-18 ENCOUNTER — PATIENT MESSAGE (OUTPATIENT)
Dept: PRIMARY CARE CLINIC | Facility: CLINIC | Age: 61
End: 2021-10-18

## 2021-10-18 ENCOUNTER — TELEPHONE (OUTPATIENT)
Dept: PRIMARY CARE CLINIC | Facility: CLINIC | Age: 61
End: 2021-10-18

## 2021-10-18 ENCOUNTER — OFFICE VISIT (OUTPATIENT)
Dept: PRIMARY CARE CLINIC | Facility: CLINIC | Age: 61
End: 2021-10-18
Payer: MEDICARE

## 2021-10-18 DIAGNOSIS — F43.10 POSTTRAUMATIC STRESS DISORDER: ICD-10-CM

## 2021-10-18 DIAGNOSIS — E78.5 HYPERLIPIDEMIA, UNSPECIFIED HYPERLIPIDEMIA TYPE: ICD-10-CM

## 2021-10-18 DIAGNOSIS — R05.9 COUGH: ICD-10-CM

## 2021-10-18 DIAGNOSIS — F32.A DEPRESSION, UNSPECIFIED DEPRESSION TYPE: ICD-10-CM

## 2021-10-18 DIAGNOSIS — I10 HYPERTENSION, UNSPECIFIED TYPE: ICD-10-CM

## 2021-10-18 DIAGNOSIS — E66.3 OVERWEIGHT: ICD-10-CM

## 2021-10-18 DIAGNOSIS — J06.9 UPPER RESPIRATORY TRACT INFECTION, UNSPECIFIED TYPE: ICD-10-CM

## 2021-10-18 DIAGNOSIS — F41.9 ANXIETY: Primary | ICD-10-CM

## 2021-10-18 DIAGNOSIS — Z87.442 HISTORY OF NEPHROLITHIASIS: ICD-10-CM

## 2021-10-18 PROBLEM — Z86.39 HISTORY OF HYPERGLYCEMIA: Status: RESOLVED | Noted: 2021-07-20 | Resolved: 2021-10-18

## 2021-10-18 PROBLEM — R79.89 ELEVATED LIVER FUNCTION TESTS: Status: RESOLVED | Noted: 2019-03-19 | Resolved: 2021-10-18

## 2021-10-18 PROBLEM — Z87.81 HISTORY OF RIB FRACTURE: Status: RESOLVED | Noted: 2017-11-14 | Resolved: 2021-10-18

## 2021-10-18 PROCEDURE — 3044F PR MOST RECENT HEMOGLOBIN A1C LEVEL <7.0%: ICD-10-PCS | Mod: CPTII,95,, | Performed by: FAMILY MEDICINE

## 2021-10-18 PROCEDURE — 3060F PR POS MICROALBUMINURIA RESULT DOCUMENTED/REVIEW: ICD-10-PCS | Mod: CPTII,95,, | Performed by: FAMILY MEDICINE

## 2021-10-18 PROCEDURE — 3060F POS MICROALBUMINURIA REV: CPT | Mod: CPTII,95,, | Performed by: FAMILY MEDICINE

## 2021-10-18 PROCEDURE — 3066F NEPHROPATHY DOC TX: CPT | Mod: CPTII,95,, | Performed by: FAMILY MEDICINE

## 2021-10-18 PROCEDURE — 3066F PR DOCUMENTATION OF TREATMENT FOR NEPHROPATHY: ICD-10-PCS | Mod: CPTII,95,, | Performed by: FAMILY MEDICINE

## 2021-10-18 PROCEDURE — 3044F HG A1C LEVEL LT 7.0%: CPT | Mod: CPTII,95,, | Performed by: FAMILY MEDICINE

## 2021-10-18 PROCEDURE — 99443 PR PHYSICIAN TELEPHONE EVALUATION 21-30 MIN: CPT | Mod: 95,,, | Performed by: FAMILY MEDICINE

## 2021-10-18 PROCEDURE — 99443 PR PHYSICIAN TELEPHONE EVALUATION 21-30 MIN: ICD-10-PCS | Mod: 95,,, | Performed by: FAMILY MEDICINE

## 2021-10-18 RX ORDER — METHYLPREDNISOLONE 4 MG/1
TABLET ORAL
Qty: 1 PACKAGE | Refills: 0 | Status: SHIPPED | OUTPATIENT
Start: 2021-10-18 | End: 2021-10-19

## 2021-10-18 RX ORDER — AZITHROMYCIN 250 MG/1
TABLET, FILM COATED ORAL
Qty: 6 TABLET | Refills: 0 | Status: SHIPPED | OUTPATIENT
Start: 2021-10-18 | End: 2021-10-19

## 2021-10-18 RX ORDER — PROMETHAZINE HYDROCHLORIDE AND CODEINE PHOSPHATE 6.25; 1 MG/5ML; MG/5ML
5 SOLUTION ORAL EVERY 6 HOURS PRN
Qty: 180 ML | Refills: 0 | Status: SHIPPED | OUTPATIENT
Start: 2021-10-18 | End: 2021-10-19

## 2021-10-19 ENCOUNTER — PATIENT MESSAGE (OUTPATIENT)
Dept: PRIMARY CARE CLINIC | Facility: CLINIC | Age: 61
End: 2021-10-19
Payer: MEDICARE

## 2021-10-19 RX ORDER — METHYLPREDNISOLONE 4 MG/1
TABLET ORAL
Qty: 1 PACKAGE | Refills: 0 | Status: CANCELLED | OUTPATIENT
Start: 2021-10-19

## 2021-10-19 RX ORDER — PROMETHAZINE HYDROCHLORIDE AND CODEINE PHOSPHATE 6.25; 1 MG/5ML; MG/5ML
5 SOLUTION ORAL EVERY 6 HOURS PRN
Qty: 180 ML | Refills: 0 | Status: CANCELLED | OUTPATIENT
Start: 2021-10-19

## 2021-10-19 RX ORDER — AZITHROMYCIN 250 MG/1
TABLET, FILM COATED ORAL
Qty: 6 TABLET | Refills: 0 | Status: CANCELLED | OUTPATIENT
Start: 2021-10-19 | End: 2021-10-23

## 2021-11-17 ENCOUNTER — OFFICE VISIT (OUTPATIENT)
Dept: PRIMARY CARE CLINIC | Facility: CLINIC | Age: 61
End: 2021-11-17
Payer: MEDICARE

## 2021-11-17 VITALS
HEART RATE: 79 BPM | WEIGHT: 240.31 LBS | BODY MASS INDEX: 31.85 KG/M2 | OXYGEN SATURATION: 97 % | DIASTOLIC BLOOD PRESSURE: 78 MMHG | SYSTOLIC BLOOD PRESSURE: 140 MMHG | RESPIRATION RATE: 18 BRPM | HEIGHT: 73 IN

## 2021-11-17 DIAGNOSIS — N52.9 ERECTILE DYSFUNCTION, UNSPECIFIED ERECTILE DYSFUNCTION TYPE: Primary | ICD-10-CM

## 2021-11-17 DIAGNOSIS — I10 HYPERTENSION, UNSPECIFIED TYPE: ICD-10-CM

## 2021-11-17 DIAGNOSIS — R73.03 PREDIABETES: ICD-10-CM

## 2021-11-17 PROCEDURE — 3078F DIAST BP <80 MM HG: CPT | Mod: CPTII,S$GLB,, | Performed by: FAMILY MEDICINE

## 2021-11-17 PROCEDURE — 1160F PR REVIEW ALL MEDS BY PRESCRIBER/CLIN PHARMACIST DOCUMENTED: ICD-10-PCS | Mod: CPTII,S$GLB,, | Performed by: FAMILY MEDICINE

## 2021-11-17 PROCEDURE — 3077F PR MOST RECENT SYSTOLIC BLOOD PRESSURE >= 140 MM HG: ICD-10-PCS | Mod: CPTII,S$GLB,, | Performed by: FAMILY MEDICINE

## 2021-11-17 PROCEDURE — 3044F HG A1C LEVEL LT 7.0%: CPT | Mod: CPTII,S$GLB,, | Performed by: FAMILY MEDICINE

## 2021-11-17 PROCEDURE — 3060F PR POS MICROALBUMINURIA RESULT DOCUMENTED/REVIEW: ICD-10-PCS | Mod: CPTII,S$GLB,, | Performed by: FAMILY MEDICINE

## 2021-11-17 PROCEDURE — 99214 OFFICE O/P EST MOD 30 MIN: CPT | Mod: S$GLB,,, | Performed by: FAMILY MEDICINE

## 2021-11-17 PROCEDURE — 93005 EKG 12-LEAD: ICD-10-PCS | Mod: S$GLB,,, | Performed by: FAMILY MEDICINE

## 2021-11-17 PROCEDURE — 3060F POS MICROALBUMINURIA REV: CPT | Mod: CPTII,S$GLB,, | Performed by: FAMILY MEDICINE

## 2021-11-17 PROCEDURE — 1159F MED LIST DOCD IN RCRD: CPT | Mod: CPTII,S$GLB,, | Performed by: FAMILY MEDICINE

## 2021-11-17 PROCEDURE — 3066F PR DOCUMENTATION OF TREATMENT FOR NEPHROPATHY: ICD-10-PCS | Mod: CPTII,S$GLB,, | Performed by: FAMILY MEDICINE

## 2021-11-17 PROCEDURE — 99999 PR PBB SHADOW E&M-EST. PATIENT-LVL IV: ICD-10-PCS | Mod: PBBFAC,,, | Performed by: FAMILY MEDICINE

## 2021-11-17 PROCEDURE — 3044F PR MOST RECENT HEMOGLOBIN A1C LEVEL <7.0%: ICD-10-PCS | Mod: CPTII,S$GLB,, | Performed by: FAMILY MEDICINE

## 2021-11-17 PROCEDURE — 3077F SYST BP >= 140 MM HG: CPT | Mod: CPTII,S$GLB,, | Performed by: FAMILY MEDICINE

## 2021-11-17 PROCEDURE — 99999 PR PBB SHADOW E&M-EST. PATIENT-LVL IV: CPT | Mod: PBBFAC,,, | Performed by: FAMILY MEDICINE

## 2021-11-17 PROCEDURE — 1159F PR MEDICATION LIST DOCUMENTED IN MEDICAL RECORD: ICD-10-PCS | Mod: CPTII,S$GLB,, | Performed by: FAMILY MEDICINE

## 2021-11-17 PROCEDURE — 3008F PR BODY MASS INDEX (BMI) DOCUMENTED: ICD-10-PCS | Mod: CPTII,S$GLB,, | Performed by: FAMILY MEDICINE

## 2021-11-17 PROCEDURE — 3008F BODY MASS INDEX DOCD: CPT | Mod: CPTII,S$GLB,, | Performed by: FAMILY MEDICINE

## 2021-11-17 PROCEDURE — 3078F PR MOST RECENT DIASTOLIC BLOOD PRESSURE < 80 MM HG: ICD-10-PCS | Mod: CPTII,S$GLB,, | Performed by: FAMILY MEDICINE

## 2021-11-17 PROCEDURE — 93010 ELECTROCARDIOGRAM REPORT: CPT | Mod: S$GLB,,, | Performed by: INTERNAL MEDICINE

## 2021-11-17 PROCEDURE — 93005 ELECTROCARDIOGRAM TRACING: CPT | Mod: S$GLB,,, | Performed by: FAMILY MEDICINE

## 2021-11-17 PROCEDURE — 3066F NEPHROPATHY DOC TX: CPT | Mod: CPTII,S$GLB,, | Performed by: FAMILY MEDICINE

## 2021-11-17 PROCEDURE — 99214 PR OFFICE/OUTPT VISIT, EST, LEVL IV, 30-39 MIN: ICD-10-PCS | Mod: S$GLB,,, | Performed by: FAMILY MEDICINE

## 2021-11-17 PROCEDURE — 93010 EKG 12-LEAD: ICD-10-PCS | Mod: S$GLB,,, | Performed by: INTERNAL MEDICINE

## 2021-11-17 PROCEDURE — 1160F RVW MEDS BY RX/DR IN RCRD: CPT | Mod: CPTII,S$GLB,, | Performed by: FAMILY MEDICINE

## 2021-11-17 RX ORDER — TADALAFIL 5 MG/1
5 TABLET ORAL DAILY
Qty: 30 TABLET | Refills: 11 | Status: SHIPPED | OUTPATIENT
Start: 2021-11-17 | End: 2022-09-27

## 2021-11-26 NOTE — TELEPHONE ENCOUNTER
Nurse called MR Hodge and informed him that after speaking with Dr Allen, Metformin should be 500mg bid. Nurse called [facility] and spoke with pharmacy and gave new scrip 500 mg metformin by mouth twice daily, disp 180. Pharmacist  Acknowledged and will process order. Advise Mr Hodge of same and he thanked nurse.   No

## 2022-02-17 ENCOUNTER — PATIENT MESSAGE (OUTPATIENT)
Dept: ADMINISTRATIVE | Facility: HOSPITAL | Age: 62
End: 2022-02-17
Payer: MEDICARE

## 2022-02-20 NOTE — TELEPHONE ENCOUNTER
No new care gaps identified.  Powered by [employer]. Reference number: [id number].   2/20/2022 4:16:41 AM CST

## 2022-02-24 RX ORDER — TRAZODONE HYDROCHLORIDE 150 MG/1
TABLET ORAL
Qty: 90 TABLET | Refills: 1 | Status: SHIPPED | OUTPATIENT
Start: 2022-02-24 | End: 2022-11-21

## 2022-03-07 ENCOUNTER — OFFICE VISIT (OUTPATIENT)
Dept: ORTHOPEDICS | Facility: CLINIC | Age: 62
End: 2022-03-07
Payer: MEDICARE

## 2022-03-07 VITALS — HEIGHT: 72 IN | BODY MASS INDEX: 32.44 KG/M2 | WEIGHT: 239.5 LBS

## 2022-03-07 DIAGNOSIS — M75.41 IMPINGEMENT SYNDROME OF RIGHT SHOULDER: Primary | ICD-10-CM

## 2022-03-07 DIAGNOSIS — M75.42 IMPINGEMENT SYNDROME OF LEFT SHOULDER: ICD-10-CM

## 2022-03-07 PROCEDURE — 3008F PR BODY MASS INDEX (BMI) DOCUMENTED: ICD-10-PCS | Mod: CPTII,S$GLB,, | Performed by: PHYSICIAN ASSISTANT

## 2022-03-07 PROCEDURE — 99213 PR OFFICE/OUTPT VISIT, EST, LEVL III, 20-29 MIN: ICD-10-PCS | Mod: 25,S$GLB,, | Performed by: PHYSICIAN ASSISTANT

## 2022-03-07 PROCEDURE — 99999 PR PBB SHADOW E&M-EST. PATIENT-LVL III: CPT | Mod: PBBFAC,,, | Performed by: PHYSICIAN ASSISTANT

## 2022-03-07 PROCEDURE — 3008F BODY MASS INDEX DOCD: CPT | Mod: CPTII,S$GLB,, | Performed by: PHYSICIAN ASSISTANT

## 2022-03-07 PROCEDURE — 1159F PR MEDICATION LIST DOCUMENTED IN MEDICAL RECORD: ICD-10-PCS | Mod: CPTII,S$GLB,, | Performed by: PHYSICIAN ASSISTANT

## 2022-03-07 PROCEDURE — 99999 PR PBB SHADOW E&M-EST. PATIENT-LVL III: ICD-10-PCS | Mod: PBBFAC,,, | Performed by: PHYSICIAN ASSISTANT

## 2022-03-07 PROCEDURE — 20610 DRAIN/INJ JOINT/BURSA W/O US: CPT | Mod: 50,S$GLB,, | Performed by: PHYSICIAN ASSISTANT

## 2022-03-07 PROCEDURE — 99213 OFFICE O/P EST LOW 20 MIN: CPT | Mod: 25,S$GLB,, | Performed by: PHYSICIAN ASSISTANT

## 2022-03-07 PROCEDURE — 1159F MED LIST DOCD IN RCRD: CPT | Mod: CPTII,S$GLB,, | Performed by: PHYSICIAN ASSISTANT

## 2022-03-07 PROCEDURE — 20610 LARGE JOINT ASPIRATION/INJECTION: BILATERAL SUBACROMIAL BURSA: ICD-10-PCS | Mod: 50,S$GLB,, | Performed by: PHYSICIAN ASSISTANT

## 2022-03-07 PROCEDURE — 1160F PR REVIEW ALL MEDS BY PRESCRIBER/CLIN PHARMACIST DOCUMENTED: ICD-10-PCS | Mod: CPTII,S$GLB,, | Performed by: PHYSICIAN ASSISTANT

## 2022-03-07 PROCEDURE — 1160F RVW MEDS BY RX/DR IN RCRD: CPT | Mod: CPTII,S$GLB,, | Performed by: PHYSICIAN ASSISTANT

## 2022-03-07 RX ORDER — TRIAMCINOLONE ACETONIDE 40 MG/ML
40 INJECTION, SUSPENSION INTRA-ARTICULAR; INTRAMUSCULAR
Status: DISCONTINUED | OUTPATIENT
Start: 2022-03-07 | End: 2022-03-07 | Stop reason: HOSPADM

## 2022-03-07 RX ADMIN — TRIAMCINOLONE ACETONIDE 40 MG: 40 INJECTION, SUSPENSION INTRA-ARTICULAR; INTRAMUSCULAR at 03:03

## 2022-03-07 NOTE — PROGRESS NOTES
Patient ID: Everette Hodge Jr. is a 62 y.o. male.    Chief Complaint: Pain of the Right Shoulder and Pain of the Left Shoulder      HISTORY:  Everette Hodge Jr. is a 62 y.o. male who returns to me today for follow up of bilateral shoulder pain.  He was last seen by me 11/16/2020.  Today he is doing well, but notes worsening pain in both shoulder recently.  There has been no new injury.  He has good relief with steroid injections and would like to repeat these today.         PMH/PSH/FamHx/SocHx:    Unchanged from prior visit.    ROS:  Constitution: Negative for chills, fever and weakness.   Respiratory: Negative for cough and shortness of breath.   Musculoskeletal: Positive for bilateral shoulder  Psychiatric/Behavioral: The patient is not nervous/anxious.       PHYSICAL EXAM:   Ht 6' (1.829 m)   Wt 108.7 kg (239 lb 8.5 oz)   BMI 32.49 kg/m²   Bilateral shoulder  Skin intact  No erythema or warmth  FROM  + imingement    ASSESSMENT/PLAN:    Everette was seen today for pain and pain.    Diagnoses and all orders for this visit:    Impingement syndrome of right shoulder  -     Large Joint Aspiration/Injection: bilateral subacromial bursa    Impingement syndrome of left shoulder  -     Large Joint Aspiration/Injection: bilateral subacromial bursa    - Bilateral shoulder CSI today  - Recommend rest, ice, activity modification  - Follow up as needed

## 2022-03-07 NOTE — PROCEDURES
Large Joint Aspiration/Injection: bilateral subacromial bursa    Date/Time: 3/7/2022 3:00 PM  Performed by: Noreen Laughlin PA-C  Authorized by: Noreen Laughlin PA-C     Consent Done?:  Yes (Verbal)  Indications:  Pain  Prep: patient was prepped and draped in usual sterile fashion    Local anesthetic:  Topical anesthetic    Details:  Needle Size:  22 G  Approach:  Posterior  Location:  Shoulder  Laterality:  Bilateral  Site:  Bilateral subacromial bursa  Medications (Right):  40 mg triamcinolone acetonide 40 mg/mL  Medications (Left):  40 mg triamcinolone acetonide 40 mg/mL  Patient tolerance:  Patient tolerated the procedure well with no immediate complications

## 2022-06-22 ENCOUNTER — OFFICE VISIT (OUTPATIENT)
Dept: PODIATRY | Facility: CLINIC | Age: 62
End: 2022-06-22
Payer: MEDICARE

## 2022-06-22 VITALS — SYSTOLIC BLOOD PRESSURE: 158 MMHG | DIASTOLIC BLOOD PRESSURE: 87 MMHG | HEART RATE: 87 BPM

## 2022-06-22 DIAGNOSIS — M76.61 ACHILLES TENDINITIS OF RIGHT LOWER EXTREMITY: Primary | ICD-10-CM

## 2022-06-22 DIAGNOSIS — B35.1 ONYCHOMYCOSIS DUE TO DERMATOPHYTE: ICD-10-CM

## 2022-06-22 PROCEDURE — 1159F MED LIST DOCD IN RCRD: CPT | Mod: CPTII,S$GLB,, | Performed by: PODIATRIST

## 2022-06-22 PROCEDURE — 99999 PR PBB SHADOW E&M-EST. PATIENT-LVL III: CPT | Mod: PBBFAC,,, | Performed by: PODIATRIST

## 2022-06-22 PROCEDURE — 99203 OFFICE O/P NEW LOW 30 MIN: CPT | Mod: S$GLB,,, | Performed by: PODIATRIST

## 2022-06-22 PROCEDURE — 99203 PR OFFICE/OUTPT VISIT, NEW, LEVL III, 30-44 MIN: ICD-10-PCS | Mod: S$GLB,,, | Performed by: PODIATRIST

## 2022-06-22 PROCEDURE — 1159F PR MEDICATION LIST DOCUMENTED IN MEDICAL RECORD: ICD-10-PCS | Mod: CPTII,S$GLB,, | Performed by: PODIATRIST

## 2022-06-22 PROCEDURE — 3079F PR MOST RECENT DIASTOLIC BLOOD PRESSURE 80-89 MM HG: ICD-10-PCS | Mod: CPTII,S$GLB,, | Performed by: PODIATRIST

## 2022-06-22 PROCEDURE — 99999 PR PBB SHADOW E&M-EST. PATIENT-LVL III: ICD-10-PCS | Mod: PBBFAC,,, | Performed by: PODIATRIST

## 2022-06-22 PROCEDURE — 3079F DIAST BP 80-89 MM HG: CPT | Mod: CPTII,S$GLB,, | Performed by: PODIATRIST

## 2022-06-22 PROCEDURE — 3077F SYST BP >= 140 MM HG: CPT | Mod: CPTII,S$GLB,, | Performed by: PODIATRIST

## 2022-06-22 PROCEDURE — 3077F PR MOST RECENT SYSTOLIC BLOOD PRESSURE >= 140 MM HG: ICD-10-PCS | Mod: CPTII,S$GLB,, | Performed by: PODIATRIST

## 2022-06-22 RX ORDER — CICLOPIROX OLAMINE 7.7 MG/100ML
SUSPENSION TOPICAL 2 TIMES DAILY
Qty: 60 ML | Refills: 3 | Status: SHIPPED | OUTPATIENT
Start: 2022-06-22 | End: 2024-02-23

## 2022-06-22 RX ORDER — METHYLPREDNISOLONE 4 MG/1
TABLET ORAL
Qty: 1 EACH | Refills: 0 | Status: SHIPPED | OUTPATIENT
Start: 2022-06-22 | End: 2022-07-13

## 2022-06-22 NOTE — PROGRESS NOTES
Subjective:      Patient ID: Everette Hodge Jr. is a 62 y.o. male.    Chief Complaint: Heel Pain (RIGHT HEEL)    Pt presents today c/o pain in right heel around the achilles tendon area. Pt denies any trauma. Pt states the pain is worse when standing/weight bearing. Pt states it began after a hike [place] a [place]. Pt is also c/o discolored toenail.       Review of Systems   Constitutional: Negative for chills, fever and malaise/fatigue.   HENT: Negative for hearing loss.    Cardiovascular: Negative for claudication.   Respiratory: Negative for shortness of breath.    Skin: Positive for nail changes. Negative for flushing and rash.   Musculoskeletal: Negative for joint pain and myalgias.   Neurological: Negative for loss of balance, numbness, paresthesias and sensory change.   Psychiatric/Behavioral: Negative for altered mental status.           Objective:      Physical Exam  Vitals reviewed.   Cardiovascular:      Pulses:           Dorsalis pedis pulses are 2+ on the right side and 2+ on the left side.        Posterior tibial pulses are 2+ on the right side and 2+ on the left side.      Comments: No edema noted b/L  Musculoskeletal:         General: Tenderness present.      Comments:   Decreased right ankle joint ROM, pain with palpation of posterior 1/3 calcaneus at region of Achilles tendon insertion. moderate pain with ankle joint ROM     No palpable dell noted       Feet:      Right foot:      Protective Sensation: 5 sites tested. 5 sites sensed.      Left foot:      Protective Sensation: 5 sites tested. 5 sites sensed.   Skin:     General: Skin is warm.      Capillary Refill: Capillary refill takes 2 to 3 seconds.      Comments: Normal skin tugor noted.   No open lesion noted b/L  Skin temp is warm to warm from proximal to distal b/L.  Webspaces clean, dry, and intact  Nails discolored and dystrophic   Neurological:      Mental Status: He is alert and oriented to person, place, and time.      Comments: Gross  sensation intact b/L               Assessment:       Encounter Diagnoses   Name Primary?    Achilles tendinitis of right lower extremity Yes    Onychomycosis due to dermatophyte          Plan:       Everette was seen today for heel pain.    Diagnoses and all orders for this visit:    Achilles tendinitis of right lower extremity    Onychomycosis due to dermatophyte    Other orders  -     methylPREDNISolone (MEDROL DOSEPACK) 4 mg tablet; use as directed  -     ciclopirox (LOPROX) 0.77 % Susp; Apply topically 2 (two) times daily.      I counseled the patient on his conditions, their implications and medical management.    Pt was advised on the etiologies and issues associated with achilles tendinitis.  Medrol dose pack prescribed for inflammation, pt advised to use ice, elevation, and OTC NSAIDs for residual pain.   Patient instructed on adequate icing techniques. Patient should ice the affected area at least once per day x 10 minutes for 10 days . I advised the  patient that extra icing would also be beneficial to ensure adequate anti inflammatory effect   Pt instructed to purchase OTC Voltaren gel 1%, use on affected area. Pt advised discontinue usage if any adverse effects should occur.       rx cicliporox for toenails  Pt will RTC in 2 weeks or sooner if any new pedal problems should arise.      .

## 2022-07-18 ENCOUNTER — HOSPITAL ENCOUNTER (OUTPATIENT)
Dept: RADIOLOGY | Facility: HOSPITAL | Age: 62
Discharge: HOME | End: 2022-07-18
Attending: PHYSICIAN ASSISTANT
Payer: MEDICARE

## 2022-07-18 ENCOUNTER — OFFICE VISIT (OUTPATIENT)
Dept: ORTHOPEDICS | Facility: CLINIC | Age: 62
End: 2022-07-18
Payer: MEDICARE

## 2022-07-18 VITALS — HEIGHT: 72 IN | WEIGHT: 243.75 LBS | BODY MASS INDEX: 33.01 KG/M2

## 2022-07-18 DIAGNOSIS — M19.011 OSTEOARTHRITIS, LOCALIZED, SHOULDER, RIGHT: Primary | ICD-10-CM

## 2022-07-18 DIAGNOSIS — M75.41 IMPINGEMENT SYNDROME OF RIGHT SHOULDER: ICD-10-CM

## 2022-07-18 DIAGNOSIS — M25.511 ACUTE PAIN OF RIGHT SHOULDER: ICD-10-CM

## 2022-07-18 PROCEDURE — 99999 PR PBB SHADOW E&M-EST. PATIENT-LVL III: CPT | Mod: PBBFAC,,, | Performed by: PHYSICIAN ASSISTANT

## 2022-07-18 PROCEDURE — 3008F PR BODY MASS INDEX (BMI) DOCUMENTED: ICD-10-PCS | Mod: CPTII,S$GLB,, | Performed by: PHYSICIAN ASSISTANT

## 2022-07-18 PROCEDURE — 20610 LARGE JOINT ASPIRATION/INJECTION: R SUBACROMIAL BURSA: ICD-10-PCS | Mod: RT,S$GLB,, | Performed by: PHYSICIAN ASSISTANT

## 2022-07-18 PROCEDURE — 20610 DRAIN/INJ JOINT/BURSA W/O US: CPT | Mod: RT,S$GLB,, | Performed by: PHYSICIAN ASSISTANT

## 2022-07-18 PROCEDURE — 73030 XR SHOULDER TRAUMA 3 VIEW RIGHT: ICD-10-PCS | Mod: 26,RT,, | Performed by: RADIOLOGY

## 2022-07-18 PROCEDURE — 99213 OFFICE O/P EST LOW 20 MIN: CPT | Mod: 25,S$GLB,, | Performed by: PHYSICIAN ASSISTANT

## 2022-07-18 PROCEDURE — 99213 PR OFFICE/OUTPT VISIT, EST, LEVL III, 20-29 MIN: ICD-10-PCS | Mod: 25,S$GLB,, | Performed by: PHYSICIAN ASSISTANT

## 2022-07-18 PROCEDURE — 3008F BODY MASS INDEX DOCD: CPT | Mod: CPTII,S$GLB,, | Performed by: PHYSICIAN ASSISTANT

## 2022-07-18 PROCEDURE — 1160F RVW MEDS BY RX/DR IN RCRD: CPT | Mod: CPTII,S$GLB,, | Performed by: PHYSICIAN ASSISTANT

## 2022-07-18 PROCEDURE — 1160F PR REVIEW ALL MEDS BY PRESCRIBER/CLIN PHARMACIST DOCUMENTED: ICD-10-PCS | Mod: CPTII,S$GLB,, | Performed by: PHYSICIAN ASSISTANT

## 2022-07-18 PROCEDURE — 73030 X-RAY EXAM OF SHOULDER: CPT | Mod: TC,RT

## 2022-07-18 PROCEDURE — 1159F PR MEDICATION LIST DOCUMENTED IN MEDICAL RECORD: ICD-10-PCS | Mod: CPTII,S$GLB,, | Performed by: PHYSICIAN ASSISTANT

## 2022-07-18 PROCEDURE — 73030 X-RAY EXAM OF SHOULDER: CPT | Mod: 26,RT,, | Performed by: RADIOLOGY

## 2022-07-18 PROCEDURE — 99999 PR PBB SHADOW E&M-EST. PATIENT-LVL III: ICD-10-PCS | Mod: PBBFAC,,, | Performed by: PHYSICIAN ASSISTANT

## 2022-07-18 PROCEDURE — 1159F MED LIST DOCD IN RCRD: CPT | Mod: CPTII,S$GLB,, | Performed by: PHYSICIAN ASSISTANT

## 2022-07-18 RX ORDER — TRIAMCINOLONE ACETONIDE 40 MG/ML
40 INJECTION, SUSPENSION INTRA-ARTICULAR; INTRAMUSCULAR
Status: DISCONTINUED | OUTPATIENT
Start: 2022-07-18 | End: 2022-07-18 | Stop reason: HOSPADM

## 2022-07-18 RX ADMIN — TRIAMCINOLONE ACETONIDE 40 MG: 40 INJECTION, SUSPENSION INTRA-ARTICULAR; INTRAMUSCULAR at 11:07

## 2022-07-18 NOTE — PROCEDURES
Large Joint Aspiration/Injection: R subacromial bursa    Date/Time: 7/18/2022 11:30 AM  Performed by: Noreen Laughlin PA-C  Authorized by: Noreen Laughlin PA-C     Consent Done?:  Yes (Verbal)  Indications:  Pain  Prep: patient was prepped and draped in usual sterile fashion    Local anesthetic:  Topical anesthetic    Details:  Needle Size:  22 G  Approach:  Posterior  Location:  Shoulder  Site:  R subacromial bursa  Medications:  40 mg triamcinolone acetonide 40 mg/mL  Patient tolerance:  Patient tolerated the procedure well with no immediate complications

## 2022-07-18 NOTE — PROGRESS NOTES
Patient ID: Everette Hodge Jr. is a 62 y.o. male.    Chief Complaint: Pain of the Right Shoulder      HISTORY:  Everette Hodge Jr. is a 62 y.o. male who returns to me today for follow up of right shoulder pain.  He was exercising in the gym a few days ago and had severe acute pain after working out.  Pain was worse the following day.  His motion has been limited due to pain.  He has pain with reaching, lifting.  He denies bruising or feeling a specific injury while exercising.  He was last seen about 4 months ago and had an injection - it was helpful, but did not seem to provide as much relief as in the past.      PMH/PSH/FamHx/SocHx:    Unchanged from prior visit.    ROS:  Constitution: Negative for chills, fever and weakness.   Respiratory: Negative for cough and shortness of breath.   Musculoskeletal: Positive for right shoulder  Psychiatric/Behavioral: The patient is not nervous/anxious.       PHYSICAL EXAM:   Ht 6' (1.829 m)   Wt 110.6 kg (243 lb 11.5 oz)   BMI 33.05 kg/m²   Right shoulder  Skin intact  No deformity  No ecchymosis  ROM is painful  , ER 50, IR L1  No TTP    IMAGING: X-rays of the right shoulder, personally reviewed by me, demonstrate mild-moderate glenohumeral DJD, subacromial osteophyte formation.  No fracture or dislocation.    ASSESSMENT/PLAN:    Everette was seen today for pain.    Diagnoses and all orders for this visit:    Osteoarthritis, localized, shoulder, right    Acute pain of right shoulder  -     X-Ray Shoulder Trauma 3 view Right; Future    Impingement syndrome of right shoulder  -     Large Joint Aspiration/Injection: R subacromial bursa    - Continue rest, activity modification  - Right shoulder CSI today  - Follow up if symptoms worsen or fail to improve

## 2022-08-30 ENCOUNTER — PATIENT MESSAGE (OUTPATIENT)
Dept: PRIMARY CARE CLINIC | Facility: CLINIC | Age: 62
End: 2022-08-30

## 2022-08-30 ENCOUNTER — OFFICE VISIT (OUTPATIENT)
Dept: PRIMARY CARE CLINIC | Facility: CLINIC | Age: 62
End: 2022-08-30
Payer: MEDICARE

## 2022-08-30 VITALS
WEIGHT: 232.38 LBS | SYSTOLIC BLOOD PRESSURE: 136 MMHG | BODY MASS INDEX: 31.48 KG/M2 | DIASTOLIC BLOOD PRESSURE: 82 MMHG | OXYGEN SATURATION: 96 % | RESPIRATION RATE: 18 BRPM | HEART RATE: 88 BPM | HEIGHT: 72 IN

## 2022-08-30 DIAGNOSIS — F41.9 ANXIETY: ICD-10-CM

## 2022-08-30 DIAGNOSIS — R73.03 PREDIABETES: ICD-10-CM

## 2022-08-30 DIAGNOSIS — F43.10 POSTTRAUMATIC STRESS DISORDER: ICD-10-CM

## 2022-08-30 DIAGNOSIS — I10 HYPERTENSION, UNSPECIFIED TYPE: ICD-10-CM

## 2022-08-30 DIAGNOSIS — Z87.442 HISTORY OF NEPHROLITHIASIS: ICD-10-CM

## 2022-08-30 DIAGNOSIS — Z11.4 ENCOUNTER FOR SCREENING FOR HIV: Primary | ICD-10-CM

## 2022-08-30 DIAGNOSIS — E78.00 PURE HYPERCHOLESTEROLEMIA: ICD-10-CM

## 2022-08-30 DIAGNOSIS — N40.0 BENIGN PROSTATIC HYPERPLASIA WITHOUT LOWER URINARY TRACT SYMPTOMS: ICD-10-CM

## 2022-08-30 LAB
BILIRUB SERPL-MCNC: ABNORMAL MG/DL
BLOOD URINE, POC: ABNORMAL
CLARITY, POC UA: CLEAR
COLOR, POC UA: ABNORMAL
GLUCOSE UR QL STRIP: NEGATIVE
KETONES UR QL STRIP: ABNORMAL
LEUKOCYTE ESTERASE URINE, POC: ABNORMAL
NITRITE, POC UA: NEGATIVE
PH, POC UA: 6
PROTEIN, POC: 30
SPECIFIC GRAVITY, POC UA: 1.01
UROBILINOGEN, POC UA: 8

## 2022-08-30 PROCEDURE — 1159F MED LIST DOCD IN RCRD: CPT | Mod: CPTII,S$GLB,, | Performed by: FAMILY MEDICINE

## 2022-08-30 PROCEDURE — 99214 PR OFFICE/OUTPT VISIT, EST, LEVL IV, 30-39 MIN: ICD-10-PCS | Mod: S$GLB,,, | Performed by: FAMILY MEDICINE

## 2022-08-30 PROCEDURE — 81002 URINALYSIS NONAUTO W/O SCOPE: CPT | Mod: S$GLB,,, | Performed by: FAMILY MEDICINE

## 2022-08-30 PROCEDURE — 1159F PR MEDICATION LIST DOCUMENTED IN MEDICAL RECORD: ICD-10-PCS | Mod: CPTII,S$GLB,, | Performed by: FAMILY MEDICINE

## 2022-08-30 PROCEDURE — 3079F DIAST BP 80-89 MM HG: CPT | Mod: CPTII,S$GLB,, | Performed by: FAMILY MEDICINE

## 2022-08-30 PROCEDURE — 3079F PR MOST RECENT DIASTOLIC BLOOD PRESSURE 80-89 MM HG: ICD-10-PCS | Mod: CPTII,S$GLB,, | Performed by: FAMILY MEDICINE

## 2022-08-30 PROCEDURE — 3075F PR MOST RECENT SYSTOLIC BLOOD PRESS GE 130-139MM HG: ICD-10-PCS | Mod: CPTII,S$GLB,, | Performed by: FAMILY MEDICINE

## 2022-08-30 PROCEDURE — 81002 POCT URINE DIPSTICK WITHOUT MICROSCOPE: ICD-10-PCS | Mod: S$GLB,,, | Performed by: FAMILY MEDICINE

## 2022-08-30 PROCEDURE — 3008F PR BODY MASS INDEX (BMI) DOCUMENTED: ICD-10-PCS | Mod: CPTII,S$GLB,, | Performed by: FAMILY MEDICINE

## 2022-08-30 PROCEDURE — 3008F BODY MASS INDEX DOCD: CPT | Mod: CPTII,S$GLB,, | Performed by: FAMILY MEDICINE

## 2022-08-30 PROCEDURE — 99214 OFFICE O/P EST MOD 30 MIN: CPT | Mod: S$GLB,,, | Performed by: FAMILY MEDICINE

## 2022-08-30 PROCEDURE — 99999 PR PBB SHADOW E&M-EST. PATIENT-LVL IV: CPT | Mod: PBBFAC,,, | Performed by: FAMILY MEDICINE

## 2022-08-30 PROCEDURE — 99999 PR PBB SHADOW E&M-EST. PATIENT-LVL IV: ICD-10-PCS | Mod: PBBFAC,,, | Performed by: FAMILY MEDICINE

## 2022-08-30 PROCEDURE — 3075F SYST BP GE 130 - 139MM HG: CPT | Mod: CPTII,S$GLB,, | Performed by: FAMILY MEDICINE

## 2022-08-30 NOTE — PROGRESS NOTES
Subjective:       Patient ID: Everette Hodge Jr. is a 62 y.o. male.    Chief Complaint: Annual Exam    HPI: 61 yo WM -- annual exam--patient is fasting --eating well doing an intermittent fasting x 2 months nfrom 256--to 232  Had BMI lost almost all fat --works out everyday alot weight lifting. Cardio stairmaster 30 min . + BM + ambulating well .  Retired.   Bought place [employer]. [place]  Diarrhea resolved with new diet and probiotics         ROS:  Skin: no psoriasis, eczema, skin cancer  No  Icterus to the skin  HEENT: No headache, ocular pain, blurred vision, diplopia, epistaxis, hoarseness change in voice, thyroid trouble    Lung: No pneumonia, asthma, Tb, wheezing, SOB no smoking  Heart: No chest pain, ankle edema, palpitations, MI, marco antonio murmur, +hypertension, +hyperlipidemia no stent bypass arrhythmia  Abdomen: No nausea, vomiting,no diarrhea, no constipation, ulcers, hepatitis, gallbladder disease, melena, hematochezia, hematemesis  +elevated liver function test  : no UTI, renal disease, +stones 20 years ago + frequency but drinks 4 liters day   MS: no fractures, O/A, lupus, rheumatoid, gout  Neuro: No dizziness, LOC, seizures   No  diabetes, no anemia, + anxiety, + depression history of posttraumatic stress--much better only on trazodone to help sleep off of Ativan  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 Trainer at gym   Objective:   Physical Exam:    General: Well nourished, well developed, no acute distress slightly overweight  Skin: No lesions  HEENT: Eyes PERRLA, EOM intact, nose patent, throat non-erythematous ears TM clear   NECK: Supple, no bruits, No JVD, no nodes  Lungs: Clear, no rales, rhonchi, wheezing  Heart: Regular rate and rhythm, no murmurs, gallops, or rubs  Abdomen: flat, bowel sounds positive, no tenderness, or organomegaly  MS: Range of motion and muscle strength intact  Neuro: Alert, CN intact, oriented X 3  Extremities: No cyanosis, clubbing, or edema         Assessment:       1.  Encounter for screening for HIV    2. Hypertension, unspecified type    3. Pure hypercholesterolemia    4. History of nephrolithiasis    5. Prediabetes    6. Anxiety    7. Posttraumatic stress disorder    8. Benign prostatic hyperplasia without lower urinary tract symptoms        Plan:       Encounter for screening for HIV  -     HIV 1/2 Ag/Ab (4th Gen); Future; Expected date: 08/30/2022    Hypertension, unspecified type  -     CBC Auto Differential; Future; Expected date: 08/30/2022  -     Comprehensive Metabolic Panel; Future; Expected date: 08/30/2022  -     Lipid Panel; Future; Expected date: 08/30/2022  -     POCT urine dipstick without microscope  -     X-Ray Chest PA And Lateral; Future; Expected date: 08/30/2022    Pure hypercholesterolemia    History of nephrolithiasis    Prediabetes    Anxiety  -     TSH; Future; Expected date: 08/30/2022    Posttraumatic stress disorder    Benign prostatic hyperplasia without lower urinary tract symptoms  -     Prostate Specific Antigen, Diagnostic; Future; Expected date: 08/30/2022    Patient has been on a special diet--lost significant amount of weight--diarrhea has resolved  Hypertension on Norvasc Cozaar  History BPH on Flomax  Insomnia on trazodone  Anxiety depression markedly improved off of all medications for stress  His Health strengthening  on Depo testosterone  Lab CBC CMP lipids TSH PSAu/a chest x-ray--had EKG  Health maintenance HIV COVID vaccine

## 2022-09-27 ENCOUNTER — PATIENT MESSAGE (OUTPATIENT)
Dept: PRIMARY CARE CLINIC | Facility: CLINIC | Age: 62
End: 2022-09-27
Payer: MEDICARE

## 2022-11-14 DIAGNOSIS — N52.9 ERECTILE DYSFUNCTION, UNSPECIFIED ERECTILE DYSFUNCTION TYPE: ICD-10-CM

## 2022-11-15 RX ORDER — TADALAFIL 5 MG/1
5 TABLET ORAL DAILY
Qty: 30 TABLET | Refills: 11 | Status: SHIPPED | OUTPATIENT
Start: 2022-11-15 | End: 2024-02-23

## 2022-11-15 RX ORDER — TADALAFIL 5 MG/1
5 TABLET ORAL DAILY
Qty: 90 TABLET | Refills: 3 | Status: SHIPPED | OUTPATIENT
Start: 2022-11-15 | End: 2022-11-15 | Stop reason: CLARIF

## 2022-11-15 NOTE — TELEPHONE ENCOUNTER
No new care gaps identified.  [facility] Embedded Care Gaps. Reference number: [id number]. 11/14/2022   9:01:10 PM CST

## 2022-11-15 NOTE — TELEPHONE ENCOUNTER
Tadalafil ordered under Dr. Lv Fairchild in error. Order canceled and resent to pharmacy under PCP, Dr. Dorian Allen, per protocol.

## 2022-11-15 NOTE — ADDENDUM NOTE
Addended by: CAROLYN SELF on: 11/15/2022 10:57 AM     Modules accepted: Orders     Statement Selected

## 2022-11-15 NOTE — TELEPHONE ENCOUNTER
Refill Decision Note   Everette Hodge  is requesting a refill authorization.  Brief Assessment and Rationale for Refill:  Approve     Medication Therapy Plan:       Medication Reconciliation Completed: No   Comments:     No Care Gaps recommended.     Note composed:10:45 AM 11/15/2022

## 2023-02-23 ENCOUNTER — HOSPITAL ENCOUNTER (OUTPATIENT)
Dept: RADIOLOGY | Facility: HOSPITAL | Age: 63
Discharge: HOME | End: 2023-02-23
Attending: PHYSICIAN ASSISTANT
Payer: MEDICARE

## 2023-02-23 ENCOUNTER — OFFICE VISIT (OUTPATIENT)
Dept: ORTHOPEDICS | Facility: CLINIC | Age: 63
End: 2023-02-23
Payer: MEDICARE

## 2023-02-23 VITALS — WEIGHT: 240.31 LBS | BODY MASS INDEX: 32.55 KG/M2 | HEIGHT: 72 IN

## 2023-02-23 DIAGNOSIS — M25.512 CHRONIC PAIN OF BOTH SHOULDERS: Primary | ICD-10-CM

## 2023-02-23 DIAGNOSIS — M25.511 CHRONIC PAIN OF BOTH SHOULDERS: Primary | ICD-10-CM

## 2023-02-23 DIAGNOSIS — M25.512 CHRONIC PAIN OF BOTH SHOULDERS: ICD-10-CM

## 2023-02-23 DIAGNOSIS — M25.511 CHRONIC PAIN OF BOTH SHOULDERS: ICD-10-CM

## 2023-02-23 DIAGNOSIS — G89.29 CHRONIC PAIN OF BOTH SHOULDERS: ICD-10-CM

## 2023-02-23 DIAGNOSIS — G89.29 CHRONIC PAIN OF BOTH SHOULDERS: Primary | ICD-10-CM

## 2023-02-23 DIAGNOSIS — S46.912A MUSCLE STRAIN OF LEFT UPPER ARM, INITIAL ENCOUNTER: ICD-10-CM

## 2023-02-23 PROCEDURE — 1159F MED LIST DOCD IN RCRD: CPT | Mod: CPTII,S$GLB,, | Performed by: PHYSICIAN ASSISTANT

## 2023-02-23 PROCEDURE — 99213 OFFICE O/P EST LOW 20 MIN: CPT | Mod: S$GLB,,, | Performed by: PHYSICIAN ASSISTANT

## 2023-02-23 PROCEDURE — 1159F PR MEDICATION LIST DOCUMENTED IN MEDICAL RECORD: ICD-10-PCS | Mod: CPTII,S$GLB,, | Performed by: PHYSICIAN ASSISTANT

## 2023-02-23 PROCEDURE — 99999 PR PBB SHADOW E&M-EST. PATIENT-LVL III: CPT | Mod: PBBFAC,,, | Performed by: PHYSICIAN ASSISTANT

## 2023-02-23 PROCEDURE — 73030 X-RAY EXAM OF SHOULDER: CPT | Mod: TC,50

## 2023-02-23 PROCEDURE — 3008F PR BODY MASS INDEX (BMI) DOCUMENTED: ICD-10-PCS | Mod: CPTII,S$GLB,, | Performed by: PHYSICIAN ASSISTANT

## 2023-02-23 PROCEDURE — 1160F PR REVIEW ALL MEDS BY PRESCRIBER/CLIN PHARMACIST DOCUMENTED: ICD-10-PCS | Mod: CPTII,S$GLB,, | Performed by: PHYSICIAN ASSISTANT

## 2023-02-23 PROCEDURE — 1160F RVW MEDS BY RX/DR IN RCRD: CPT | Mod: CPTII,S$GLB,, | Performed by: PHYSICIAN ASSISTANT

## 2023-02-23 PROCEDURE — 73030 XR SHOULDER COMPLETE 2 OR MORE VIEWS BILATERAL: ICD-10-PCS | Mod: 26,50,, | Performed by: RADIOLOGY

## 2023-02-23 PROCEDURE — 99999 PR PBB SHADOW E&M-EST. PATIENT-LVL III: ICD-10-PCS | Mod: PBBFAC,,, | Performed by: PHYSICIAN ASSISTANT

## 2023-02-23 PROCEDURE — 3008F BODY MASS INDEX DOCD: CPT | Mod: CPTII,S$GLB,, | Performed by: PHYSICIAN ASSISTANT

## 2023-02-23 PROCEDURE — 73030 X-RAY EXAM OF SHOULDER: CPT | Mod: 26,50,, | Performed by: RADIOLOGY

## 2023-02-23 PROCEDURE — 99213 PR OFFICE/OUTPT VISIT, EST, LEVL III, 20-29 MIN: ICD-10-PCS | Mod: S$GLB,,, | Performed by: PHYSICIAN ASSISTANT

## 2023-02-24 NOTE — PROGRESS NOTES
Patient ID: Everette Hodge Jr. is a 63 y.o. male.    Chief Complaint: Pain of the Right Shoulder and Pain of the Left Shoulder      HISTORY:  Everette Hodge Jr. is a 63 y.o. male who returns to me today for follow up of shoulder pain and recent injury to his left arm.  He was last seen by me 7/18/2022.  About one week ago he states that he noticed some bruising along his upper arm.  He does not recall a specific injury, however he does heavy lifting at the gym regularly.  He has not noticed significant weakness in his left arm, however he has tried to rest it and avoid heavy lifting the past week.      PMH/PSH/FamHx/SocHx:    Unchanged from prior visit.    ROS:  Constitution: Negative for chills, fever and weakness.   Respiratory: Negative for cough and shortness of breath.   Musculoskeletal: Positive for left arm pain  Psychiatric/Behavioral: The patient is not nervous/anxious.       PHYSICAL EXAM:   Ht 6' (1.829 m)   Wt 109 kg (240 lb 4.8 oz)   BMI 32.59 kg/m²   Bilateral shoulder  Near full ROM, mild stiffness at baseline  No TTP right shoulder or left arm  There is diffuse ecchymosis left upper arm  No obvious deformity  5/5 biceps, 5/5 triceps  No palpable defect to distal biceps or triceps insertion  Pain with resisted shoulder flexion    IMAGING: X-rays of the bilateral shoulder, personally reviewed by me, demonstrate moderate glenohumeral DJD.  No fracture or dislocation.     ASSESSMENT/PLAN:    Everette was seen today for pain and pain.    Diagnoses and all orders for this visit:    Chronic pain of both shoulders  -     X-Ray Shoulder Complete Bilateral; Future    Muscle strain of left upper arm, initial encounter      - Suspect muscle strain due to heavy lifting- will continue to monitor for now.  Recommend continued rest x 2 weeks then gradually return to activity as tolerated if pain improves

## 2023-06-19 RX ORDER — AMLODIPINE BESYLATE 5 MG/1
TABLET ORAL
Refills: 0 | OUTPATIENT
Start: 2023-06-19

## 2023-06-19 NOTE — TELEPHONE ENCOUNTER
Care Due:                  Date            Visit Type   Department     Provider  --------------------------------------------------------------------------------                                MYCHART                              [place]  [facility]  Last Visit: 08-      FICE VISIT   PRIMARY CARE   Dorian Allen  Next Visit: None Scheduled  None         None Found                                                            Last  Test          Frequency    Reason                     Performed    Due Date  --------------------------------------------------------------------------------    Office Visit  12 months..  tadalafiL, traZODone.....  08- 08-    [facility] Embedded Care Due Messages. Reference number: [id number].   6/19/2023 8:08:46 AM CDT

## 2023-06-19 NOTE — TELEPHONE ENCOUNTER
No care due was identified.  [facility] Embedded Care Due Messages. Reference number: [id number].   6/19/2023 8:09:13 AM CDT

## 2023-06-19 NOTE — TELEPHONE ENCOUNTER
Refill Decision Note   Everette Hodge  is requesting a refill authorization.  Brief Assessment and Rationale for Refill:  Quick Discontinue     Medication Therapy Plan: Pharmacy is requesting new scripts for the following medications without required information, (sig/ frequency/qty/etc)     Medication Reconciliation Completed: No   Comments:     No Care Gaps recommended.     Note composed:9:12 AM 06/19/2023

## 2023-07-25 RX ORDER — TRAZODONE HYDROCHLORIDE 150 MG/1
TABLET ORAL
Qty: 90 TABLET | Refills: 0 | Status: SHIPPED | OUTPATIENT
Start: 2023-07-25 | End: 2023-10-18

## 2023-07-26 NOTE — TELEPHONE ENCOUNTER
Refill Decision Note   Everette Naveen  is requesting a refill authorization.  Brief Assessment and Rationale for Refill:  Approve     Medication Therapy Plan:         Comments:     Note composed:9:45 PM 07/25/2023

## 2023-07-26 NOTE — TELEPHONE ENCOUNTER
No care due was identified.  [facility] Embedded Care Due Messages. Reference number: [id number].   7/25/2023 9:16:12 PM CDT

## 2023-09-18 ENCOUNTER — PATIENT MESSAGE (OUTPATIENT)
Dept: PRIMARY CARE CLINIC | Facility: CLINIC | Age: 63
End: 2023-09-18
Payer: MEDICARE

## 2023-10-18 ENCOUNTER — PATIENT MESSAGE (OUTPATIENT)
Dept: CARDIOLOGY | Facility: CLINIC | Age: 63
End: 2023-10-18
Payer: MEDICARE

## 2023-10-18 RX ORDER — TRAZODONE HYDROCHLORIDE 150 MG/1
TABLET ORAL
Qty: 90 TABLET | Refills: 0 | Status: SHIPPED | OUTPATIENT
Start: 2023-10-18 | End: 2023-12-22

## 2023-10-19 NOTE — TELEPHONE ENCOUNTER
Care Due:                  Date            Visit Type   Department     Provider  --------------------------------------------------------------------------------                                MYCHART                              FOLLOWUP/OF  [facility]  Last Visit: 08-      FICE VISIT   PRIMARY CARE   Dorian Allen  Next Visit: None Scheduled  None         None Found                                                            Last  Test          Frequency    Reason                     Performed    Due Date  --------------------------------------------------------------------------------    Office Visit  15 months..  tadalafiL, traZODone.....  08- 11-    [facility] Embedded Care Due Messages. Reference number: [id number].   10/18/2023 9:21:03 PM CDT

## 2023-10-19 NOTE — TELEPHONE ENCOUNTER
Provider Staff:  Action required for this patient     Please see care gap opportunities below in Care Due Message.    Thanks!  [facility]     Appointments      Date Provider   Last Visit   8/30/2022 Dorian Allen MD   Next Visit   Visit date not found Dorian Allen MD     Refill Decision Note   Everette Hodge  is requesting a refill authorization.  Brief Assessment and Rationale for Refill:  Approve     Medication Therapy Plan:         Comments:     Note composed:11:05 PM 10/18/2023

## 2023-10-26 DIAGNOSIS — I10 HYPERTENSION: ICD-10-CM

## 2023-12-21 NOTE — TELEPHONE ENCOUNTER
Care Due:                  Date            Visit Type   Department     Provider  --------------------------------------------------------------------------------                                MYCHART                              [place]  [facility]  Last Visit: 08-      FICE VISIT   PRIMARY CARE   Dorian Allen  Next Visit: None Scheduled  None         None Found                                                            Last  Test          Frequency    Reason                     Performed    Due Date  --------------------------------------------------------------------------------    Office Visit  15 months..  tadalafiL, traZODone.....  08- 11-    [facility] Embedded Care Due Messages. Reference number: [id number].   12/20/2023 9:03:00 PM CST

## 2023-12-22 RX ORDER — TRAZODONE HYDROCHLORIDE 150 MG/1
TABLET ORAL
Qty: 90 TABLET | Refills: 0 | Status: SHIPPED | OUTPATIENT
Start: 2023-12-22 | End: 2024-02-26

## 2024-02-21 ENCOUNTER — PATIENT OUTREACH (OUTPATIENT)
Dept: ADMINISTRATIVE | Facility: CLINIC | Age: 64
End: 2024-02-21
Payer: MEDICARE

## 2024-02-21 NOTE — PROGRESS NOTES
2nd Attempt made to reach patient for TCC call. Left voicemail please call [phone] leave first name, last name, and  for Hetal I will return your call.

## 2024-02-21 NOTE — PROGRESS NOTES
C3 nurse attempted to contact Everette Hodge Jr.  for a TCC post hospital discharge follow up call. No answer. Left voicemail with callback information. The patient has a scheduled HOSFU appointment with Lisette Tamez on 2/23/24 @ 1100.

## 2024-02-22 NOTE — TELEPHONE ENCOUNTER
Care Due:                  Date            Visit Type   Department     Provider  --------------------------------------------------------------------------------                                MYCHART                              FOLLOWUP/OF  [facility]  Last Visit: 08-      FICE VISIT   PRIMARY CARE   Dorian Allen  Next Visit: None Scheduled  None         None Found                                                            Last  Test          Frequency    Reason                     Performed    Due Date  --------------------------------------------------------------------------------    Office Visit  15 months..  tadalafiL, traZODone.....  08- 11-    [facility] Embedded Care Due Messages. Reference number: [id number].   2/21/2024 9:31:29 PM CST

## 2024-02-22 NOTE — PROGRESS NOTES
3rd Attempt made to reach patient for TCC call. Left voicemail please call [phone] leave first name, last name, and  for Hetal I will return your call.

## 2024-02-23 ENCOUNTER — OFFICE VISIT (OUTPATIENT)
Dept: PRIMARY CARE CLINIC | Facility: CLINIC | Age: 64
End: 2024-02-23
Payer: MEDICARE

## 2024-02-23 VITALS
BODY MASS INDEX: 33.04 KG/M2 | DIASTOLIC BLOOD PRESSURE: 78 MMHG | RESPIRATION RATE: 17 BRPM | OXYGEN SATURATION: 98 % | SYSTOLIC BLOOD PRESSURE: 138 MMHG | WEIGHT: 243.94 LBS | HEART RATE: 76 BPM | HEIGHT: 72 IN

## 2024-02-23 DIAGNOSIS — Z79.01 CURRENT USE OF LONG TERM ANTICOAGULATION: ICD-10-CM

## 2024-02-23 DIAGNOSIS — I34.0 MITRAL VALVE INSUFFICIENCY, UNSPECIFIED ETIOLOGY: ICD-10-CM

## 2024-02-23 DIAGNOSIS — Z79.890 LONG-TERM CURRENT USE OF TESTOSTERONE CYPIONATE: ICD-10-CM

## 2024-02-23 DIAGNOSIS — E78.00 PURE HYPERCHOLESTEROLEMIA: ICD-10-CM

## 2024-02-23 DIAGNOSIS — Z09 HOSPITAL DISCHARGE FOLLOW-UP: Primary | ICD-10-CM

## 2024-02-23 DIAGNOSIS — I48.0 PAROXYSMAL ATRIAL FIBRILLATION: ICD-10-CM

## 2024-02-23 PROCEDURE — 99214 OFFICE O/P EST MOD 30 MIN: CPT | Mod: S$GLB,,, | Performed by: NURSE PRACTITIONER

## 2024-02-23 PROCEDURE — 99999 PR PBB SHADOW E&M-EST. PATIENT-LVL III: CPT | Mod: PBBFAC,,, | Performed by: NURSE PRACTITIONER

## 2024-02-23 RX ORDER — METOPROLOL SUCCINATE 100 MG/1
1 TABLET, EXTENDED RELEASE ORAL DAILY
COMMUNITY
Start: 2024-02-15 | End: 2025-02-14

## 2024-02-23 RX ORDER — ROSUVASTATIN CALCIUM 5 MG/1
5 TABLET, COATED ORAL DAILY
Qty: 90 TABLET | Refills: 3 | Status: SHIPPED | OUTPATIENT
Start: 2024-02-23 | End: 2024-02-26 | Stop reason: SDUPTHER

## 2024-02-23 RX ORDER — ATORVASTATIN CALCIUM 40 MG/1
1 TABLET, FILM COATED ORAL DAILY
COMMUNITY
Start: 2024-02-16 | End: 2024-02-23

## 2024-02-23 RX ORDER — APIXABAN 5 MG/1
5 TABLET, FILM COATED ORAL 2 TIMES DAILY
COMMUNITY

## 2024-02-23 NOTE — PROGRESS NOTES
Chief Complaint  Chief Complaint   Patient presents with    Follow-up     Hospital follow up        HPI    Hospital follow-up -Patient with new onset chest pain on ash Wednesday. Originated posterior neck radiating to chest tightness. To ER [place] [facility] diagnosed AFib with RVR.  Treated with Cardene drip progressively weaned off.  Metoprolol started in addition Eliquis 5 mg b.i.d..  Self converted to normal sinus rhythm per patient report.  Presents to clinic today for follow-up.    Hyperlipidemia -patient with a history of intermittently elevated cholesterol generally normalizes on its own presents to clinic with concern regarding his cholesterol readings from [facility].  Review of his lab work reveals LDL of 313 and an HDL of 19 a workup in the emergency room.  Patient not currently on a statin.  Has not taken a statin in the past.  Does endorse regular testosterone use which she feels may contribute to his high cholesterol.  Does endorse occasional chest pain particularly prior to this emergency room visit in addition to new onset shortness of breath since the atrial fibrillation.  Echocardiogram with mitral valve regurgitation at [facility].  Upcoming appointment with Dr. Prajapati with Cardiology next week.    Pre diabetes-recent A1c of 6.1 at [facility].  Weight described as stable.            PAST MEDICAL HISTORY:  Past Medical History:   Diagnosis Date    Cataract     Depression     Diabetes mellitus     Diabetes mellitus, type 2     Hypertension     PTSD (post-traumatic stress disorder)        PAST SURGICAL HISTORY:  No past surgical history on file.    SOCIAL HISTORY:  Social History     Socioeconomic History    Marital status: Significant Other   Tobacco Use    Smoking status: Former     Types: Cigarettes    Smokeless tobacco: Never   Substance and Sexual Activity    Alcohol use: No    Drug use: No    Sexual activity: Yes     Partners: Male       FAMILY HISTORY:  Family History   Problem Relation Age of Onset     Cancer Mother     Cancer Father     Diabetes Father     Diabetes Sister        ALLERGIES AND MEDICATIONS: updated and reviewed.  Review of patient's allergies indicates:  No Known Allergies  Current Outpatient Medications   Medication Sig Dispense Refill    ascorbic acid, vitamin C, (VITAMIN C) 1000 MG tablet Take 2,000 mg by mouth once daily.      ELIQUIS 5 mg Tab Take 5 mg by mouth 2 (two) times daily.      metoprolol succinate (TOPROL-XL) 100 MG 24 hr tablet Take 1 tablet by mouth once daily.      multivitamin (THERAGRAN) per tablet Take 1 tablet by mouth once daily.      testosterone cypionate (DEPOTESTOTERONE CYPIONATE) 200 mg/mL injection Inject into the muscle. Twice a week      traZODone (DESYREL) 150 MG tablet TAKE 1 TABLET BY MOUTH IN THE  EVENING 90 tablet 0    rosuvastatin (CRESTOR) 5 MG tablet Take 1 tablet (5 mg total) by mouth once daily. 90 tablet 3     No current facility-administered medications for this visit.         ROS  Review of Systems   Constitutional:  Negative for chills and fever.   HENT:  Negative for ear pain, postnasal drip and sinus pain.    Respiratory:  Positive for shortness of breath. Negative for cough.    Cardiovascular:  Negative for chest pain.   Gastrointestinal:  Negative for diarrhea, nausea and vomiting.           PHYSICAL EXAM  Vitals:    02/23/24 1052   BP: 138/78   BP Location: Left arm   Patient Position: Sitting   BP Method: Medium (Manual)   Pulse: 76   Resp: 17   SpO2: 98%   Weight: 110.6 kg (243 lb 15 oz)   Height: 6' (1.829 m)    Body mass index is 33.08 kg/m².  Weight: 110.6 kg (243 lb 15 oz)   Height: 6' (182.9 cm)     Physical Exam  Constitutional:       Appearance: He is well-developed.   HENT:      Head: Normocephalic.      Right Ear: Tympanic membrane normal.      Left Ear: Tympanic membrane normal.      Mouth/Throat:      Pharynx: Uvula midline.   Eyes:      Conjunctiva/sclera: Conjunctivae normal.   Cardiovascular:      Rate and Rhythm: Normal rate and  regular rhythm.      Pulses: Normal pulses.           Radial pulses are 2+ on the right side and 2+ on the left side.      Heart sounds: Normal heart sounds. No murmur heard.     Comments: Palpable rate and rhythm regular.  No lower extremity swelling.  Pulmonary:      Effort: Pulmonary effort is normal.      Breath sounds: Normal breath sounds. No wheezing.      Comments: Lungs clear bilaterally with no noted wheezing crackles.  Abdominal:      General: Bowel sounds are normal.      Palpations: Abdomen is soft.      Tenderness: There is no abdominal tenderness.   Lymphadenopathy:      Cervical: No cervical adenopathy.   Skin:     General: Skin is warm and dry.      Findings: No rash.   Neurological:      Mental Status: He is alert and oriented to person, place, and time.           Health Maintenance         Date Due Completion Date    RSV Vaccine (Age 60+ and Pregnant patients) (1 - 1-dose 60+ series) Never done ---    Monkeypox Vaccine (2 - Risk 2-dose series) 09/19/2022 8/22/2022    Influenza Vaccine (1) 09/01/2023 10/21/2022    COVID-19 Vaccine (3 - 2023-24 season) 09/01/2023 10/25/2021    Pneumococcal Vaccines (Age 65+) (2 of 2 - PCV) 02/03/2024 11/10/2014    Abdominal Aortic Aneurysm Screening 02/03/2024 12/18/2017    Hemoglobin A1c (Prediabetes) 02/14/2025 2/14/2024    Colorectal Cancer Screening 05/14/2025 5/14/2015 (Done)    Override on 5/14/2015: Done ([facility]//RECORDS REQUESTED/JM)    Lipid Panel 02/14/2029 2/14/2024    TETANUS VACCINE 07/20/2031 7/20/2021              Assessment & Plan    Problem List Items Addressed This Visit          Unprioritized    Current use of long term anticoagulation    Hyperlipidemia    Relevant Medications    rosuvastatin (CRESTOR) 5 MG tablet    Other Relevant Orders    Lipid Panel    Comprehensive Metabolic Panel  Trial of low dose crestor with plans to increase for goal LDL of less than 100.  Will defer to [facility] for possible Rx PCSK - 9 and future monitoring.        Long-term current use of testosterone cypionate    Mitral valve insufficiency    Paroxysmal atrial fibrillation    Relevant Medications    ELIQUIS 5 mg Tab    metoprolol succinate (TOPROL-XL) 100 MG 24 hr tablet  Follow up with cardiology as planned.      Other Visit Diagnoses       Hospital discharge follow-up    -  Primary            Follow-up: Follow up in about 3 months (around 5/23/2024).    Constanza Tamez    Medication List with Changes/Refills   New Medications    ROSUVASTATIN (CRESTOR) 5 MG TABLET    Take 1 tablet (5 mg total) by mouth once daily.   Current Medications    ASCORBIC ACID, VITAMIN C, (VITAMIN C) 1000 MG TABLET    Take 2,000 mg by mouth once daily.    ELIQUIS 5 MG TAB    Take 5 mg by mouth 2 (two) times daily.    METOPROLOL SUCCINATE (TOPROL-XL) 100 MG 24 HR TABLET    Take 1 tablet by mouth once daily.    MULTIVITAMIN (THERAGRAN) PER TABLET    Take 1 tablet by mouth once daily.    TESTOSTERONE CYPIONATE (DEPOTESTOTERONE CYPIONATE) 200 MG/ML INJECTION    Inject into the muscle. Twice a week    TRAZODONE (DESYREL) 150 MG TABLET    TAKE 1 TABLET BY MOUTH IN THE  EVENING   Discontinued Medications    AMLODIPINE (NORVASC) 5 MG TABLET    TAKE 1 TABLET BY MOUTH ONCE DAILY.    ATORVASTATIN (LIPITOR) 40 MG TABLET    Take 1 tablet by mouth once daily.    CICLOPIROX (LOPROX) 0.77 % SUSP    Apply topically 2 (two) times daily.    LOSARTAN (COZAAR) 50 MG TABLET    TAKE 1 TABLET EVERY DAY    TADALAFIL (CIALIS) 5 MG TABLET    Take 1 tablet (5 mg total) by mouth once daily.

## 2024-02-26 DIAGNOSIS — E78.00 PURE HYPERCHOLESTEROLEMIA: ICD-10-CM

## 2024-02-26 RX ORDER — ROSUVASTATIN CALCIUM 5 MG/1
5 TABLET, COATED ORAL DAILY
Qty: 90 TABLET | Refills: 0 | Status: SHIPPED | OUTPATIENT
Start: 2024-02-26 | End: 2025-02-25

## 2024-02-26 RX ORDER — TRAZODONE HYDROCHLORIDE 150 MG/1
TABLET ORAL
Qty: 90 TABLET | Refills: 3 | Status: SHIPPED | OUTPATIENT
Start: 2024-02-26 | End: 2024-03-05 | Stop reason: SDUPTHER

## 2024-02-26 NOTE — TELEPHONE ENCOUNTER
No care due was identified.  [facility] Embedded Care Due Messages. Reference number: [id number].   2/26/2024 2:37:11 PM CST

## 2024-02-27 NOTE — TELEPHONE ENCOUNTER
Called pt to inform him refills have been sent in and he will need appointment for next refills. Pt verbalized understanding. Pt also stated he did want the refills sent to [facility] not center well and he said he called us twice to tell us that but said it is ok this time as long as he gets it soon.

## 2024-03-05 ENCOUNTER — OFFICE VISIT (OUTPATIENT)
Dept: PRIMARY CARE CLINIC | Facility: CLINIC | Age: 64
End: 2024-03-05
Payer: MEDICARE

## 2024-03-05 ENCOUNTER — TELEPHONE (OUTPATIENT)
Dept: HEPATOLOGY | Facility: CLINIC | Age: 64
End: 2024-03-05
Payer: MEDICARE

## 2024-03-05 VITALS
DIASTOLIC BLOOD PRESSURE: 86 MMHG | HEART RATE: 71 BPM | BODY MASS INDEX: 32.44 KG/M2 | OXYGEN SATURATION: 97 % | WEIGHT: 239.5 LBS | SYSTOLIC BLOOD PRESSURE: 138 MMHG | RESPIRATION RATE: 18 BRPM | HEIGHT: 72 IN

## 2024-03-05 DIAGNOSIS — F32.9 REACTIVE DEPRESSION: ICD-10-CM

## 2024-03-05 DIAGNOSIS — Z79.01 CURRENT USE OF LONG TERM ANTICOAGULATION: ICD-10-CM

## 2024-03-05 DIAGNOSIS — Z23 NEED FOR PNEUMOCOCCAL VACCINE: Primary | ICD-10-CM

## 2024-03-05 DIAGNOSIS — I48.0 PAROXYSMAL ATRIAL FIBRILLATION: ICD-10-CM

## 2024-03-05 DIAGNOSIS — F41.9 ANXIETY: ICD-10-CM

## 2024-03-05 DIAGNOSIS — R73.03 PREDIABETES: ICD-10-CM

## 2024-03-05 DIAGNOSIS — F43.10 POSTTRAUMATIC STRESS DISORDER: ICD-10-CM

## 2024-03-05 DIAGNOSIS — E78.00 PURE HYPERCHOLESTEROLEMIA: ICD-10-CM

## 2024-03-05 DIAGNOSIS — Z23 FLU VACCINE NEED: ICD-10-CM

## 2024-03-05 DIAGNOSIS — R79.89 ELEVATED LIVER FUNCTION TESTS: ICD-10-CM

## 2024-03-05 PROCEDURE — 99999 PR PBB SHADOW E&M-EST. PATIENT-LVL IV: CPT | Mod: PBBFAC,,, | Performed by: FAMILY MEDICINE

## 2024-03-05 PROCEDURE — 90694 VACC AIIV4 NO PRSRV 0.5ML IM: CPT | Mod: S$GLB,,, | Performed by: FAMILY MEDICINE

## 2024-03-05 PROCEDURE — 90677 PCV20 VACCINE IM: CPT | Mod: S$GLB,,, | Performed by: FAMILY MEDICINE

## 2024-03-05 PROCEDURE — 99214 OFFICE O/P EST MOD 30 MIN: CPT | Mod: 25,S$GLB,, | Performed by: FAMILY MEDICINE

## 2024-03-05 PROCEDURE — G0008 ADMIN INFLUENZA VIRUS VAC: HCPCS | Mod: S$GLB,,, | Performed by: FAMILY MEDICINE

## 2024-03-05 PROCEDURE — G0009 ADMIN PNEUMOCOCCAL VACCINE: HCPCS | Mod: S$GLB,,, | Performed by: FAMILY MEDICINE

## 2024-03-05 RX ORDER — TRAZODONE HYDROCHLORIDE 150 MG/1
150 TABLET ORAL NIGHTLY
Qty: 90 TABLET | Refills: 3 | Status: SHIPPED | OUTPATIENT
Start: 2024-03-05

## 2024-03-05 RX ORDER — METFORMIN HYDROCHLORIDE 500 MG/1
500 TABLET ORAL 2 TIMES DAILY WITH MEALS
Qty: 60 TABLET | Refills: 5 | Status: SHIPPED | OUTPATIENT
Start: 2024-03-05 | End: 2024-06-06

## 2024-03-05 NOTE — PROGRESS NOTES
Verified pt ID using name and . RE Desouza. Administered Influenza 65+ in Left Deltoid per physician order using aseptic technique. Aspirated and no blood return noted. Pt tolerated well with no adverse reactions noted.   Verified pt ID using name and . RE Desouza. Administered Prevnar 20 in Right Deltoid per physician order using aseptic technique. Aspirated and no blood return noted. Pt tolerated well with no adverse reactions noted.

## 2024-03-05 NOTE — PROGRESS NOTES
Subjective:       Patient ID: Everette Hodge Jr. is a 64 y.o. male.    Chief Complaint: Hospital Follow Up      HPI:63 yo WM in for hospital follow up --nelsy physiologist at [facility]--seen in the emergency room 2024--got up in the morning had a sharp pain in the base of the skull--radiating to the spinal cord--push should cell forward into the chest. Not sharp just severe tightness .  Patient new went into AFib because watch told him.  Went to the emergency room  Feels great --exercising   Partner in hopital x 3 mo last year One month in PICU--Rehab--dialysis --air embolism 5 mo in hospital 2 mo PICU 1 mo in another ICU --very stressful . 5 X cancer survivior--large hernian --renal cell carcinoma left kidney on already had part of the right kidney removed.  Elevated liver function AST 47 ALT 66 patient on testosterone  Hyperlipidemia started on atorvastatin 40 mg cholesterol decrease to  triglyceride 226-141 LDL from 311-108 HDL 25  Elevated  patient exercises a lot  Hyperglycemia glucose 193 now 165 on no medications or          ROS:  Skin: no psoriasis, eczema, skin cancer  No  Icterus to the skin  HEENT: No headache, ocular pain, blurred vision, diplopia, epistaxis, hoarseness change in voice, thyroid trouble    Lung: No pneumonia, asthma, Tb, wheezing, SOB no smoking  Heart: No chest pain, ankle edema, palpitations, MI, marco antonio murmur, +hypertension, +hyperlipidemia no stent bypass arrhythmia--see lipids above   Abdomen: No nausea, vomiting,no diarrhea, no constipation, ulcers, hepatitis, gallbladder disease, melena, hematochezia, hematemesis  +elevated liver function test  : no UTI, renal disease, +stones 20 years ago + frequency but drinks 4 liters day   MS: no fractures, O/A, lupus, rheumatoid, gout  Neuro: No dizziness, LOC, seizures   +  diabetes hyperglycemia, no anemia, + anxiety, + depression history of posttraumatic stress--much better only on trazodone to help sleep off of  Ativan--see illness partner   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 Trainer at gym   Objective:   Physical Exam:    General: Well nourished, well developed, no acute distress slightly overweight  Skin: No lesions  HEENT: Eyes PERRLA, EOM intact, nose patent, throat non-erythematous ears TM clear   NECK: Supple, no bruits, No JVD, no nodes  Lungs: Clear, no rales, rhonchi, wheezing  Heart: Regular rate and rhythm, no murmurs, gallops, or rubs  Abdomen: flat, bowel sounds positive, no tenderness, or organomegaly  MS: Range of motion and muscle strength intact  Neuro: Alert, CN intact, oriented X 3  Extremities: No cyanosis, clubbing, or edema         Assessment:       1. Need for pneumococcal vaccine    2. Paroxysmal atrial fibrillation    3. Elevated liver function tests    4. Prediabetes    5. Pure hypercholesterolemia    6. Flu vaccine need    7. Current use of long term anticoagulation    8. Anxiety    9. Reactive depression    10. Posttraumatic stress disorder        Plan:       Need for pneumococcal vaccine  -     (In Office Administered) Pneumococcal Conjugate Vaccine (20 Valent) (IM) (Preferred)    Paroxysmal atrial fibrillation  -     CK; Future; Expected date: 03/05/2024    Elevated liver function tests  -     HEPATITIS PANEL, ACUTE; Future; Expected date: 03/05/2024  -     HETEROPHILE AB SCREEN; Future; Expected date: 03/05/2024  -     US Abdomen Limited; Future; Expected date: 03/05/2024  -     Ambulatory referral/consult to Hepatology; Future; Expected date: 03/12/2024  -     TESTOSTERONE PANEL; Future; Expected date: 03/05/2024    Prediabetes  -     Hemoglobin A1C; Future; Expected date: 03/05/2024  -     Hemoglobin A1C; Future; Expected date: 09/05/2024    Pure hypercholesterolemia  -     Comprehensive Metabolic Panel; Future; Expected date: 03/05/2024  -     CBC Auto Differential; Future; Expected date: 09/05/2024  -     Comprehensive Metabolic Panel; Future; Expected date: 09/05/2024  -     Lipid Panel; Future;  Expected date: 09/05/2024    Flu vaccine need  -     Influenza (FLUAD) - Quadrivalent (Adjuvanted) *Preferred* (65+) (PF)    Current use of long term anticoagulation    Anxiety    Reactive depression    Posttraumatic stress disorder    Other orders  -     traZODone (DESYREL) 150 MG tablet; Take 1 tablet (150 mg total) by mouth every evening.  Dispense: 90 tablet; Refill: 3  -     metFORMIN (GLUCOPHAGE) 500 MG tablet; Take 1 tablet (500 mg total) by mouth 2 (two) times daily with meals.  Dispense: 60 tablet; Refill: 5      Main Reason follow-up emergency room  History of atrial fibrillation with rapid ventricular response seen in ER started on Eliquis metoprolol  Hyperlipidemia--started on atorvastatin 40 mg--markedly improved cholesterol triglyceride LDL all improved  Elevated liver function tests patient on testosterone--will get acute hepatitis panel/Monospot/abdominal ultrasound see hepatologist may need to get off testosterone  Hyperglycemia--glucose 165 needs to be on metformin 1 p.o. b.i.d. start was 1 q.d. for 6 weeks then go to b.i.d.  History BPH on Flomax  Insomnia on trazodone  Anxiety depression markedly improved off of all medications for stress  His Health strengthening  on Depo testosterone  Lab CMP hemoglobin A1c CPK in 6 weeks---CBCs CMP hemoglobin A1c testosterone free and total in 6 months

## 2024-03-05 NOTE — PATIENT INSTRUCTIONS
High Liver Function test   Probably Testerone  Acute Hepatitis Panel   Mono Spot   Abdominal Ultrasound   See Hepatology     Hyperglycemia   1800 Calories Diet, Low Fat Diet  Metformin 500 BID  Lab 6 weeks CMP,Hemoglobin A1C, CK    Hyperlipidemia  Much improved on Atorvastatin   Redo CMP, Hemoglobin A1C, Lipid 6 months    Continue Eliquis and Metoprolol   See Cardiology

## 2024-03-05 NOTE — TELEPHONE ENCOUNTER
The patient was contacted to schedule an appointment from referral.  An appointment have been scheduled with Martina Chan on Tuesday, June 11, 2024 at 2PM (Virtual).  A copy of the appointment have been mailed out.

## 2024-06-06 RX ORDER — METFORMIN HYDROCHLORIDE 500 MG/1
500 TABLET ORAL 2 TIMES DAILY WITH MEALS
Qty: 180 TABLET | Refills: 0 | Status: SHIPPED | OUTPATIENT
Start: 2024-06-06

## 2024-06-06 NOTE — TELEPHONE ENCOUNTER
Refill Routing Note   Medication(s) are not appropriate for processing by [facility] for the following reason(s):        Required labs abnormal    [facility] action(s):  Defer     Requires labs : Yes           Pharmacist review requested: Yes     Appointments  past 12m or future 3m with PCP    Date Provider   Last Visit   3/5/2024 Dorian Allen MD   Next Visit   6/10/2024 Dorian Allen MD   ED visits in past 90 days: 0        Note composed:10:29 AM 06/06/2024

## 2024-06-06 NOTE — TELEPHONE ENCOUNTER
Care Due:                  Date            Visit Type   Department     Provider  --------------------------------------------------------------------------------                                MYCHART                              FOLLOWUP/OF  [facility]  Last Visit: 03-      FICE VISIT   PRIMARY CARE   Dorian Allen                               -                              [facility]  Next Visit: 06-      CARE ([facility])   PRIMARY CARE   Dorian Allen                                                            Last  Test          Frequency    Reason                     Performed    Due Date  --------------------------------------------------------------------------------    HBA1C.......  6 months...  metFORMIN................  Not Found    Overdue    [employer] Embedded Care Due Messages. Reference number: [id number].   6/06/2024 8:01:56 AM CDT

## 2024-06-06 NOTE — TELEPHONE ENCOUNTER
Refill Routing Note   Medication(s) are not appropriate for processing by [facility] for the following reason(s):        Required labs abnormal:     [facility] action(s):  Defer     Requires labs : Yes - A1C due 8/12/24          Pharmacist review requested: Yes     Appointments  past 12m or future 3m with PCP    Date Provider   Last Visit   3/5/2024 Dorian Allen MD   Next Visit   6/10/2024 Dorian Allen MD   ED visits in past 90 days: 0        Note composed:12:10 PM 06/06/2024

## 2024-06-11 ENCOUNTER — TELEPHONE (OUTPATIENT)
Dept: HEPATOLOGY | Facility: CLINIC | Age: 64
End: 2024-06-11
Payer: MEDICARE

## 2024-06-11 NOTE — TELEPHONE ENCOUNTER
The patient was contacted to reschedule appointment.  The patient mentioned he was at another appointment with the Flu.  He will call back when he get home or tomorrow.     112

## 2024-06-11 NOTE — TELEPHONE ENCOUNTER
Please contact pt to reschedule his new pt virtual appt, per Martina.  The pt was not able to join the virtual visit today he stated that he was driving and on his way to another appt.

## 2024-06-13 ENCOUNTER — TELEPHONE (OUTPATIENT)
Dept: HEPATOLOGY | Facility: CLINIC | Age: 64
End: 2024-06-13
Payer: MEDICARE

## 2024-06-13 NOTE — TELEPHONE ENCOUNTER
The patient was contacted to schedule an appointment from a referral.  An appointment has been scheduled with Martina Chan  on Tuesday, October 1, 2024 at 2:00 PM.  Patient confirmed.  A copy of the appointment has been mailed out.

## 2024-08-28 ENCOUNTER — OFFICE VISIT (OUTPATIENT)
Dept: PRIMARY CARE CLINIC | Facility: CLINIC | Age: 64
End: 2024-08-28
Payer: MEDICARE

## 2024-08-28 VITALS
HEART RATE: 68 BPM | HEIGHT: 72 IN | SYSTOLIC BLOOD PRESSURE: 114 MMHG | OXYGEN SATURATION: 98 % | BODY MASS INDEX: 29.26 KG/M2 | DIASTOLIC BLOOD PRESSURE: 66 MMHG | RESPIRATION RATE: 18 BRPM | WEIGHT: 216.06 LBS

## 2024-08-28 DIAGNOSIS — N40.0 BENIGN PROSTATIC HYPERPLASIA, UNSPECIFIED WHETHER LOWER URINARY TRACT SYMPTOMS PRESENT: ICD-10-CM

## 2024-08-28 DIAGNOSIS — E78.00 PURE HYPERCHOLESTEROLEMIA: ICD-10-CM

## 2024-08-28 DIAGNOSIS — R73.03 PREDIABETES: ICD-10-CM

## 2024-08-28 DIAGNOSIS — Z53.21 PROCEDURE AND TREATMENT NOT CARRIED OUT DUE TO PATIENT LEAVING PRIOR TO BEING SEEN BY HEALTH CARE PROVIDER: ICD-10-CM

## 2024-08-28 DIAGNOSIS — F41.9 ANXIETY: ICD-10-CM

## 2024-08-28 DIAGNOSIS — F32.9 REACTIVE DEPRESSION: ICD-10-CM

## 2024-08-28 DIAGNOSIS — I10 HYPERTENSION, UNSPECIFIED TYPE: ICD-10-CM

## 2024-08-28 DIAGNOSIS — I48.0 PAROXYSMAL ATRIAL FIBRILLATION: ICD-10-CM

## 2024-08-28 DIAGNOSIS — R79.89 ELEVATED LIVER FUNCTION TESTS: ICD-10-CM

## 2024-08-28 DIAGNOSIS — Z79.01 CURRENT USE OF LONG TERM ANTICOAGULATION: ICD-10-CM

## 2024-08-28 DIAGNOSIS — R68.82 DECREASED LIBIDO: Primary | ICD-10-CM

## 2024-08-28 PROCEDURE — 3288F FALL RISK ASSESSMENT DOCD: CPT | Mod: CPTII,S$GLB,, | Performed by: FAMILY MEDICINE

## 2024-08-28 PROCEDURE — 3044F HG A1C LEVEL LT 7.0%: CPT | Mod: CPTII,S$GLB,, | Performed by: FAMILY MEDICINE

## 2024-08-28 PROCEDURE — 3078F DIAST BP <80 MM HG: CPT | Mod: CPTII,S$GLB,, | Performed by: FAMILY MEDICINE

## 2024-08-28 PROCEDURE — 99999 PR PBB SHADOW E&M-EST. PATIENT-LVL III: CPT | Mod: PBBFAC,,, | Performed by: FAMILY MEDICINE

## 2024-08-28 PROCEDURE — 1101F PT FALLS ASSESS-DOCD LE1/YR: CPT | Mod: CPTII,S$GLB,, | Performed by: FAMILY MEDICINE

## 2024-08-28 PROCEDURE — 99214 OFFICE O/P EST MOD 30 MIN: CPT | Mod: S$GLB,,, | Performed by: FAMILY MEDICINE

## 2024-08-28 PROCEDURE — 1159F MED LIST DOCD IN RCRD: CPT | Mod: CPTII,S$GLB,, | Performed by: FAMILY MEDICINE

## 2024-08-28 PROCEDURE — 3008F BODY MASS INDEX DOCD: CPT | Mod: CPTII,S$GLB,, | Performed by: FAMILY MEDICINE

## 2024-08-28 PROCEDURE — 3074F SYST BP LT 130 MM HG: CPT | Mod: CPTII,S$GLB,, | Performed by: FAMILY MEDICINE

## 2024-08-28 RX ORDER — METOPROLOL SUCCINATE 100 MG/1
100 TABLET, EXTENDED RELEASE ORAL DAILY
Qty: 90 TABLET | Refills: 1 | Status: CANCELLED | OUTPATIENT
Start: 2024-08-28 | End: 2025-08-28

## 2024-08-28 RX ORDER — TRAZODONE HYDROCHLORIDE 150 MG/1
150 TABLET ORAL NIGHTLY
Qty: 90 TABLET | Refills: 1 | Status: CANCELLED | OUTPATIENT
Start: 2024-08-28

## 2024-08-28 RX ORDER — ROSUVASTATIN CALCIUM 5 MG/1
5 TABLET, COATED ORAL DAILY
Qty: 90 TABLET | Refills: 1 | Status: CANCELLED | OUTPATIENT
Start: 2024-08-28 | End: 2025-08-28

## 2024-08-28 RX ORDER — METFORMIN HYDROCHLORIDE 500 MG/1
500 TABLET ORAL 2 TIMES DAILY WITH MEALS
Qty: 180 TABLET | Refills: 1 | Status: CANCELLED | OUTPATIENT
Start: 2024-08-28

## 2024-08-28 RX ORDER — APIXABAN 5 MG/1
5 TABLET, FILM COATED ORAL 2 TIMES DAILY
Qty: 180 TABLET | Refills: 1 | Status: CANCELLED | OUTPATIENT
Start: 2024-08-28

## 2024-08-28 NOTE — PROGRESS NOTES
Subjective:       Patient ID: Everette Hodge Jr. is a 64 y.o. male.    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 5 X cancer survivior--large hernian --renal cell carcinoma left kidney on already had part of the right kidney removed.  Elevated liver function AST 47 ALT 66 patient on testosterone  Hyperlipidemia started on atorvastatin 40 mg cholesterol decrease to  triglyceride 226-141 LDL from 311-108 HDL 25  Elevated  patient exercises a lot  Hyperglycemia glucose 193 now 165 on no medications or   Decreased libido--off of testosterone since August last year--wants to go get rejuvenamine         ROS:  Skin: no psoriasis, eczema, skin cancer  No  Icterus to the skin  HEENT: No headache, ocular pain, blurred vision, diplopia, epistaxis, hoarseness change in voice, thyroid trouble    Lung: No pneumonia, asthma, Tb, wheezing, SOB no smoking  Heart: No chest pain, ankle edema, palpitations, MI, marco antonio murmur, +hypertension, +hyperlipidemia no stent bypass arrhythmia--see lipids above   Abdomen: No nausea, vomiting,no diarrhea, no constipation, ulcers, hepatitis, gallbladder disease, melena, hematochezia, hematemesis  +elevated liver function test  : no UTI, renal disease, +stones 20 years ago + frequency but drinks 4 liters day   MS: no fractures, O/A, lupus, rheumatoid, gout  Neuro: No dizziness, LOC, seizures   +  diabetes hyperglycemia, no anemia, + anxiety, + depression history of posttraumatic stress--much better only on trazodone to help sleep off of Ativan--see illness partner    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 Trainer at gym   Objective:   Physical Exam:    General: Well nourished, well developed, no acute distress slightly overweight  Skin: No lesions  HEENT: Eyes PERRLA, EOM intact, nose patent, throat non-erythematous ears TM clear   NECK: Supple, no bruits, No JVD, no nodes  Lungs: Clear, no rales, rhonchi, wheezing  Heart: Regular rate and rhythm, no murmurs, gallops, or rubs  Abdomen: flat, bowel sounds positive, no tenderness, or organomegaly  Rectal no hemorrhoids--minimal increase prostate size right lobe but overall normal--no lesions--nontender well-defined  MS: Range of motion and muscle strength intact  Neuro: Alert, CN intact, oriented X 3  Extremities: No cyanosis, clubbing, or edema         Assessment:       1. Decreased libido    2. Hypertension, unspecified type    3. Paroxysmal atrial fibrillation    4. Current use of long term anticoagulation    5. Prediabetes    6. Elevated liver function tests    7. Pure hypercholesterolemia    8. Anxiety    9. Reactive depression    10. Benign prostatic hyperplasia, unspecified whether lower urinary tract symptoms present    11. Procedure and treatment not carried out due to patient leaving prior to being seen by health care provider          Plan:       Decreased libido  -     TSH; Future; Expected date: 02/28/2025  -     T4, Free; Future; Expected date: 02/28/2025  -     TESTOSTERONE PANEL; Future; Expected date: 08/28/2024  -     PROSTATE SPECIFIC ANTIGEN, DIAGNOSTIC; Future; Expected date: 08/28/2024    Hypertension, unspecified type  -     CBC Auto Differential; Future; Expected date: 02/28/2025  -     Comprehensive Metabolic Panel; Future; Expected date: 02/28/2025  -     TSH; Future; Expected date: 02/28/2025  -     T4, Free; Future; Expected date: 02/28/2025    Paroxysmal atrial fibrillation    Current use of long term anticoagulation    Prediabetes  -     Hemoglobin A1C; Future; Expected date: 02/28/2025    Elevated liver  function tests    Pure hypercholesterolemia  -     Lipid Panel; Future; Expected date: 02/28/2025    Anxiety    Reactive depression    Benign prostatic hyperplasia, unspecified whether lower urinary tract symptoms present  -     PROSTATE SPECIFIC ANTIGEN, DIAGNOSTIC; Future; Expected date: 08/28/2024    Procedure and treatment not carried out due to patient leaving prior to being seen by health care provider        Main Reason follow-up emergency room  Decreased libido--wants restart testosterone--has been off of it for about a year--prostate basically normal--has minimal increase in ALT 52--was 66--AST normal  History of atrial fibrillation with rapid ventricular response seen in ER started on Eliquis metoprolol  Hyperlipidemia--started on atorvastatin 40 mg-within normal limits  CPK increase but exercises a lot daily--272 was 483  Elevated liver function tests patient on testosterone--will get acute hepatitis panel/Monospot/abdominal ultrasound see hepatologist may need to get off testosterone  Hyperglycemia--glucose 117 was 165--hemoglobin A1c 6.8 needs to be on metformin 1 p.o. b.i.d.   History BPH on Flomax  Insomnia on trazodone  Anxiety depression markedly improved off of all medications for stress  Lab CBCs CMP hemoglobin A1c CPK free and total testosterone six-month

## 2024-09-19 ENCOUNTER — PATIENT MESSAGE (OUTPATIENT)
Dept: PRIMARY CARE CLINIC | Facility: CLINIC | Age: 64
End: 2024-09-19
Payer: MEDICARE

## 2024-10-10 DIAGNOSIS — E78.00 PURE HYPERCHOLESTEROLEMIA: ICD-10-CM

## 2024-10-10 NOTE — TELEPHONE ENCOUNTER
No care due was identified.  [facility] Embedded Care Due Messages. Reference number: [id number].   10/10/2024 9:34:08 AM CDT

## 2024-10-14 RX ORDER — TRAZODONE HYDROCHLORIDE 150 MG/1
150 TABLET ORAL NIGHTLY
Qty: 90 TABLET | Refills: 3 | Status: SHIPPED | OUTPATIENT
Start: 2024-10-14

## 2024-10-14 RX ORDER — METFORMIN HYDROCHLORIDE 500 MG/1
500 TABLET ORAL 2 TIMES DAILY WITH MEALS
Qty: 180 TABLET | Refills: 3 | Status: SHIPPED | OUTPATIENT
Start: 2024-10-14

## 2024-10-14 RX ORDER — ROSUVASTATIN CALCIUM 5 MG/1
5 TABLET, COATED ORAL DAILY
Qty: 90 TABLET | Refills: 3 | Status: SHIPPED | OUTPATIENT
Start: 2024-10-14 | End: 2025-10-14

## 2025-03-24 DIAGNOSIS — Z00.00 ENCOUNTER FOR MEDICARE ANNUAL WELLNESS EXAM: ICD-10-CM

## 2025-04-17 ENCOUNTER — TELEPHONE (OUTPATIENT)
Dept: PRIMARY CARE CLINIC | Facility: CLINIC | Age: 65
End: 2025-04-17
Payer: MEDICARE

## 2025-04-17 DIAGNOSIS — I48.0 PAROXYSMAL ATRIAL FIBRILLATION: ICD-10-CM

## 2025-04-17 RX ORDER — TRIAMTERENE/HYDROCHLOROTHIAZID 37.5-25 MG
1 TABLET ORAL DAILY
COMMUNITY
Start: 2024-11-29 | End: 2025-04-17 | Stop reason: SDUPTHER

## 2025-04-17 RX ORDER — NIFEDIPINE 60 MG/1
60 TABLET, EXTENDED RELEASE ORAL DAILY
Qty: 90 TABLET | Refills: 1 | Status: SHIPPED | OUTPATIENT
Start: 2025-04-17

## 2025-04-17 RX ORDER — TRIAMTERENE/HYDROCHLOROTHIAZID 37.5-25 MG
1 TABLET ORAL DAILY
Qty: 90 TABLET | Refills: 1 | Status: SHIPPED | OUTPATIENT
Start: 2025-04-17

## 2025-04-17 RX ORDER — NIFEDIPINE 60 MG/1
60 TABLET, EXTENDED RELEASE ORAL DAILY
COMMUNITY
Start: 2025-01-14 | End: 2025-04-17 | Stop reason: SDUPTHER

## 2025-04-17 RX ORDER — METOPROLOL SUCCINATE 100 MG/1
100 TABLET, EXTENDED RELEASE ORAL DAILY
Qty: 90 TABLET | Refills: 1 | Status: SHIPPED | OUTPATIENT
Start: 2025-04-17 | End: 2026-04-17

## 2025-04-17 NOTE — TELEPHONE ENCOUNTER
----- Message from Eb sent at 4/17/2025  4:00 PM CDT -----  Regarding: Pharmacy for Tameka  Contact: Pharmacy [phone]  .1MEDICALADVICE Patient is calling for Medical Advice regarding: Pharmacist called to speak to Tameka in office regarding RX Toporal  MG; She would like to speak to office. Please call pharmacy at number provided.How long has patient had these symptoms:Pharmacy name and phone#:[facility] - [facility] [facility] - [address] [place] [address]Ppugz: [phone] Fax: [phone] Patient wants a call back or thru [facility]: CallComments:Please advise patient replies from provider may take up to 48 hours.

## 2025-04-17 NOTE — TELEPHONE ENCOUNTER
----- Message from Tech Amaiya sent at 4/17/2025  2:09 PM CDT -----  Requesting an RX refill or new RX.Is this a refill or new RX: refills (5)RX name and strength (copy/paste from chart): - nifedipine- Atorvastain- Triamterene- metFORMIN (GLUCOPHAGE) 500 MG tabletIs this a 30 day or 90 day RX: 90Pharmacy name and phone # (copy/paste from chart):  [facility] - [place] [address] [place] [address]Vtqco: [phone] Fax: [phone] doctors have asked that we provide their patients with the following 2 reminders -- prescription refills can take up to 72 hours, and a friendly reminder that in the future you can use your [facility] account to request refills: y

## 2025-04-17 NOTE — TELEPHONE ENCOUNTER
----- Message from Eb sent at 4/17/2025  4:00 PM CDT -----  Regarding: Pharmacy for Tameka  Contact: Pharmacy [phone]  .1MEDICALADVICE Patient is calling for Medical Advice regarding: Pharmacist called to speak to Tameka in office regarding RX Toporal  MG; She would like to speak to office. Please call pharmacy at number provided.How long has patient had these symptoms:Pharmacy name and phone#:[facility] - [place] [facility] - [address] [place] [address]Ylxdz: [phone] Fax: [phone] Patient wants a call back or thru [facility]: CallComments:Please advise patient replies from provider may take up to 48 hours.

## 2025-04-17 NOTE — TELEPHONE ENCOUNTER
Care Due:                  Date            Visit Type   Department     Provider  --------------------------------------------------------------------------------                                MYCHART                              FO[place]  [facility]  Last Visit: 08-      FICE VISIT   PRIMARY CARE   Dorian Allen                               -                              [facility]  Next Visit: 05-      CARE ([facility])   PRIMARY CARE   Dorian Allen                                                            Last  Test          Frequency    Reason                     Performed    Due Date  --------------------------------------------------------------------------------    HBA1C.......  6 months...  metFORMIN................  08- 02-    Health [facility] Embedded Care Due Messages. Reference number: [id number].   4/17/2025 4:32:32 PM CDT

## 2025-04-17 NOTE — TELEPHONE ENCOUNTER
----- Message from Tech Amaiya sent at 4/17/2025  2:09 PM CDT -----  Requesting an RX refill or new RX.Is this a refill or new RX: refills (5)RX name and strength (copy/paste from chart): - nifedipine- Atorvastain- Triamterene- metFORMIN (GLUCOPHAGE) 500 MG tabletIs this a 30 day or 90 day RX: 90Pharmacy name and phone # (copy/paste from chart):  [facility] [place] [facility] [address] [place] [address]Fouoz: [phone] Fax: [phone] doctors have asked that we provide their patients with the following 2 reminders -- prescription refills can take up to 72 hours, and a friendly reminder that in the future you can use your [facility] account to request refills: y

## 2025-04-17 NOTE — TELEPHONE ENCOUNTER
Spoke to [facility], which is who called us.  She was able to transfer Rx's from [facility] with refills.  However she will find out who gives him the Triamterene and Nifedipine.  He told her he just came from dialysis so it's probably hematology.  I scheduled him for an appointment on 5/7/2025 at 7:40

## 2025-04-17 NOTE — TELEPHONE ENCOUNTER
DISREGARD LAST MESSAGE ABOUT DIALYSIS.  The pharmacy thought the person that came in their today was the patient, but it was his partner who said he just came from dialysis.      Scheduled patient for 5/7/25 with you.  That is the soonest he could come in,but he is about to run out of some of his meds.      Metoprolol  mg. Qd  Traimterene/HCTZ 37.5/25 mg. Qd   Nifedipine XL 60 mg. Qd     The triamterene and Nifedipine was originally given by a doctor at [facility].  Can you give him a 30 day supply of these meds to get him through until his visit.

## 2025-05-07 ENCOUNTER — OFFICE VISIT (OUTPATIENT)
Dept: PRIMARY CARE CLINIC | Facility: CLINIC | Age: 65
End: 2025-05-07
Payer: MEDICARE

## 2025-05-07 VITALS
DIASTOLIC BLOOD PRESSURE: 72 MMHG | WEIGHT: 211.56 LBS | OXYGEN SATURATION: 96 % | BODY MASS INDEX: 28.65 KG/M2 | HEART RATE: 99 BPM | SYSTOLIC BLOOD PRESSURE: 124 MMHG | RESPIRATION RATE: 18 BRPM | HEIGHT: 72 IN

## 2025-05-07 DIAGNOSIS — G47.00 INSOMNIA, UNSPECIFIED TYPE: ICD-10-CM

## 2025-05-07 DIAGNOSIS — F32.9 REACTIVE DEPRESSION: ICD-10-CM

## 2025-05-07 DIAGNOSIS — E11.36 TYPE 2 DIABETES MELLITUS WITH DIABETIC CATARACT, WITHOUT LONG-TERM CURRENT USE OF INSULIN: ICD-10-CM

## 2025-05-07 DIAGNOSIS — I10 HYPERTENSION, UNSPECIFIED TYPE: Primary | ICD-10-CM

## 2025-05-07 DIAGNOSIS — E78.00 PURE HYPERCHOLESTEROLEMIA: ICD-10-CM

## 2025-05-07 DIAGNOSIS — R73.03 PREDIABETES: ICD-10-CM

## 2025-05-07 DIAGNOSIS — I48.0 PAROXYSMAL ATRIAL FIBRILLATION: ICD-10-CM

## 2025-05-07 DIAGNOSIS — F41.9 ANXIETY: ICD-10-CM

## 2025-05-07 PROCEDURE — 99999 PR PBB SHADOW E&M-EST. PATIENT-LVL III: CPT | Mod: PBBFAC,,, | Performed by: FAMILY MEDICINE

## 2025-05-07 RX ORDER — ZOLPIDEM TARTRATE 5 MG/1
TABLET ORAL
Qty: 30 TABLET | Refills: 5 | Status: SHIPPED | OUTPATIENT
Start: 2025-05-07

## 2025-05-07 RX ORDER — APIXABAN 5 MG/1
5 TABLET, FILM COATED ORAL 2 TIMES DAILY
Qty: 180 TABLET | Refills: 3 | Status: SHIPPED | OUTPATIENT
Start: 2025-05-07

## 2025-05-07 NOTE — PROGRESS NOTES
Subjective:       Patient ID: Everette Hodge Jr. is a 65 y.o. male.    Chief Complaint: Annual Exam      HPI: 64 yo WM------eating well--+BM --ambulating well   Patient has difficulty staying asleep at night has been on trazodone  Hyperlipidemia on Crestor  Type 2 diabetes on Glucophage--glucose has been doing well  Hypertension blood pressure 124/72 on Procardia 60 mg and Maxzide  Atrial fibrillation on Eliquis and Toprol           ROS:  Skin: no psoriasis, eczema, skin cancer  No  Icterus to the skin  HEENT: No headache, ocular pain, blurred vision, diplopia, epistaxis, hoarseness change in voice, thyroid trouble    Lung: No pneumonia, asthma, Tb, wheezing, SOB no smoking  Heart: No chest pain, ankle edema, palpitations, MI, marco antonio murmur, +hypertension, +hyperlipidemia no stent bypass arrhythmia--see lipids above   Abdomen: No nausea, vomiting,no diarrhea, no constipation, ulcers, hepatitis, gallbladder disease, melena, hematochezia, hematemesis  +elevated liver function test  : no UTI, renal disease, +stones 20 years ago + frequency but drinks 4 liters day   MS: no fractures, O/A, lupus, rheumatoid, gout  Neuro: No dizziness, LOC, seizures   +  diabetes hyperglycemia, no anemia, + anxiety, + depression history of posttraumatic stress--much better only on trazodone to help sleep off of Ativan--see illness partner   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 Trainer at gym   Objective:   Physical Exam:    General: Well nourished, well developed, no acute distress slightly overweight  Skin: No lesions  HEENT: Eyes PERRLA, EOM intact, nose patent, throat non-erythematous ears TM clear   NECK: Supple, no bruits, No JVD, no nodes  Lungs: Clear, no rales, rhonchi, wheezing  Heart: Regular rate and rhythm, no murmurs, gallops, or rubs  Abdomen: flat, bowel sounds positive, no tenderness, or organomegaly  Rectal no hemorrhoids--minimal increase prostate size right lobe but overall normal--no lesions--nontender well-defined  MS: Range  of motion and muscle strength intact  Neuro: Alert, CN intact, oriented X 3  Extremities: No cyanosis, clubbing, or edema         Assessment:       1. Hypertension, unspecified type    2. Paroxysmal atrial fibrillation    3. Pure hypercholesterolemia    4. Reactive depression    5. Anxiety    6. Prediabetes    7. Type 2 diabetes mellitus with diabetic cataract, without long-term current use of insulin            Plan:       Hypertension, unspecified type    Paroxysmal atrial fibrillation  -     ELIQUIS 5 mg Tab; Take 1 tablet (5 mg total) by mouth 2 (two) times daily.  Dispense: 180 tablet; Refill: 3    Pure hypercholesterolemia    Reactive depression    Anxiety    Prediabetes    Type 2 diabetes mellitus with diabetic cataract, without long-term current use of insulin          Main Reason follow-up emergency room  Insomnia---Ambien   Decreased libido--wants restart testosterone--has been off of it for about a year--prostate basically normal--has minimal increase in ALT 52--was 66--AST normal  History of atrial fibrillation with rapid ventricular response seen in ER started on Eliquis metoprolol  Hyperlipidemia--started on atorvastatin 40 mg-within normal limits  CPK increase but exercises a lot daily--272 was 483  Elevated liver function tests patient on testosterone--will get acute hepatitis panel/Monospot/abdominal ultrasound see hepatologist may need to get off testosterone  Hyperglycemia--glucose 117 was 165--hemoglobin A1c 6.8 needs to be on metformin 1 p.o. b.i.d.   History BPH on Flomax  Insomnia on trazodone  Anxiety depression markedly improved off of all medications for stress  Lab CBCs CMP hemoglobin A1c CPK free and total testosterone do lab as in computer

## 2025-05-13 DIAGNOSIS — R97.20 ELEVATED PSA: Primary | ICD-10-CM

## 2025-05-22 ENCOUNTER — PATIENT MESSAGE (OUTPATIENT)
Dept: PRIMARY CARE CLINIC | Facility: CLINIC | Age: 65
End: 2025-05-22
Payer: MEDICARE

## 2025-05-27 ENCOUNTER — OFFICE VISIT (OUTPATIENT)
Dept: UROLOGY | Facility: CLINIC | Age: 65
End: 2025-05-27
Payer: MEDICARE

## 2025-05-27 ENCOUNTER — RESULTS FOLLOW-UP (OUTPATIENT)
Dept: UROLOGY | Facility: CLINIC | Age: 65
End: 2025-05-27

## 2025-05-27 VITALS
DIASTOLIC BLOOD PRESSURE: 73 MMHG | BODY MASS INDEX: 27.89 KG/M2 | HEIGHT: 72 IN | SYSTOLIC BLOOD PRESSURE: 124 MMHG | HEART RATE: 77 BPM | WEIGHT: 205.94 LBS

## 2025-05-27 DIAGNOSIS — R97.20 ELEVATED PSA: Primary | ICD-10-CM

## 2025-05-27 DIAGNOSIS — R39.11 HESITANCY OF MICTURITION: ICD-10-CM

## 2025-05-27 LAB
BILIRUB SERPL-MCNC: NORMAL MG/DL
BLOOD URINE, POC: NORMAL
CLARITY, POC UA: CLEAR
COLOR, POC UA: YELLOW
GLUCOSE UR QL STRIP: NORMAL
KETONES UR QL STRIP: NORMAL
LEUKOCYTE ESTERASE URINE, POC: NORMAL
NITRITE, POC UA: NORMAL
PH, POC UA: 5
POC RESIDUAL URINE VOLUME: 0 ML (ref 0–100)
PROTEIN, POC: NORMAL
SPECIFIC GRAVITY, POC UA: 1.01
UROBILINOGEN, POC UA: NORMAL

## 2025-05-27 PROCEDURE — 1126F AMNT PAIN NOTED NONE PRSNT: CPT | Mod: CPTII,S$GLB,, | Performed by: NURSE PRACTITIONER

## 2025-05-27 PROCEDURE — 3078F DIAST BP <80 MM HG: CPT | Mod: CPTII,S$GLB,, | Performed by: NURSE PRACTITIONER

## 2025-05-27 PROCEDURE — 1160F RVW MEDS BY RX/DR IN RCRD: CPT | Mod: CPTII,S$GLB,, | Performed by: NURSE PRACTITIONER

## 2025-05-27 PROCEDURE — 81002 URINALYSIS NONAUTO W/O SCOPE: CPT | Mod: S$GLB,,, | Performed by: NURSE PRACTITIONER

## 2025-05-27 PROCEDURE — 3008F BODY MASS INDEX DOCD: CPT | Mod: CPTII,S$GLB,, | Performed by: NURSE PRACTITIONER

## 2025-05-27 PROCEDURE — 1159F MED LIST DOCD IN RCRD: CPT | Mod: CPTII,S$GLB,, | Performed by: NURSE PRACTITIONER

## 2025-05-27 PROCEDURE — 3288F FALL RISK ASSESSMENT DOCD: CPT | Mod: CPTII,S$GLB,, | Performed by: NURSE PRACTITIONER

## 2025-05-27 PROCEDURE — 1101F PT FALLS ASSESS-DOCD LE1/YR: CPT | Mod: CPTII,S$GLB,, | Performed by: NURSE PRACTITIONER

## 2025-05-27 PROCEDURE — 99204 OFFICE O/P NEW MOD 45 MIN: CPT | Mod: S$GLB,,, | Performed by: NURSE PRACTITIONER

## 2025-05-27 PROCEDURE — 51798 US URINE CAPACITY MEASURE: CPT | Mod: S$GLB,,, | Performed by: NURSE PRACTITIONER

## 2025-05-27 PROCEDURE — 99999 PR PBB SHADOW E&M-EST. PATIENT-LVL IV: CPT | Mod: PBBFAC,,, | Performed by: NURSE PRACTITIONER

## 2025-05-27 PROCEDURE — 3044F HG A1C LEVEL LT 7.0%: CPT | Mod: CPTII,S$GLB,, | Performed by: NURSE PRACTITIONER

## 2025-05-27 PROCEDURE — 3074F SYST BP LT 130 MM HG: CPT | Mod: CPTII,S$GLB,, | Performed by: NURSE PRACTITIONER

## 2025-05-27 RX ORDER — TAMSULOSIN HYDROCHLORIDE 0.4 MG/1
0.4 CAPSULE ORAL DAILY
Qty: 30 CAPSULE | Refills: 11 | Status: SHIPPED | OUTPATIENT
Start: 2025-05-27 | End: 2025-06-26

## 2025-05-27 NOTE — PROGRESS NOTES
Subjective:      Everette Hodge Jr. is a 65 y.o. male who presents for evaluation of elevated PSA.      Elevated PSA  Patient is here with an elevated PSA. He has no personal history and no family history of prostate cancer. Reports previous PAULINE revealing enlarged prostate, no nodules. He has a prior genitourinary history of UTI and hypogonadism- currently on TRT from outside provider  Previous PSA values are :  Lab Results   Component Value Date    PSA 8.58 (H) 05/07/2025     Reports nocturia up to 3 x per night as well as hesitancy. Previously on Flomax; stopped due to symptom resolution  Hx a fib; On Eliquis 5 mg daily     The following portions of the patient's history were reviewed and updated as appropriate: allergies, current medications, past family history, past medical history, past social history, past surgical history and problem list.    Review of Systems  Constitutional: no fever or chills  ENT: no nasal congestion or sore throat  Respiratory: no cough or shortness of breath  Cardiovascular: no chest pain or palpitations  Gastrointestinal: no nausea or vomiting, tolerating diet  Genitourinary: as per HPI  Hematologic/Lymphatic: no easy bruising or lymphadenopathy  Musculoskeletal: no arthralgias or myalgias  Neurological: no seizures or tremors  Behavioral/Psych: no auditory or visual hallucinations     Objective:   Vitals: /73 (BP Location: Left arm, Patient Position: Sitting)   Pulse 77   Ht 6' (1.829 m)   Wt 93.4 kg (205 lb 14.6 oz)   BMI 27.93 kg/m²     Physical Exam   General: alert and oriented, no acute distress  Head: normocephalic, atraumatic  Neck: supple, no lymphadenopathy, normal ROM, no masses  Respiratory: Symmetric expansion, non-labored breathing  Genitourinary: defer   Skin: normal coloration and turgor, no rashes, no suspicious skin lesions noted  Neuro: alert and oriented x3, no gross deficits  Psych: normal judgment and insight, normal mood/affect, and  non-anxious    Physical Exam    Lab Review   Urinalysis demonstrates no ua   Lab Results   Component Value Date    WBC 8.07 05/07/2025    HGB 13.6 (L) 05/07/2025    HGB 17.5 08/23/2024    HCT 41.6 05/07/2025    HCT 50.5 08/23/2024    MCV 96 05/07/2025    MCV 91 08/23/2024     05/07/2025     08/23/2024     Lab Results   Component Value Date    CREATININE 1.1 05/07/2025    BUN 10 05/07/2025     Lab Results   Component Value Date    PSA 8.58 (H) 05/07/2025     Lab Results   Component Value Date    PSA 8.58 (H) 05/07/2025    PSADIAG 8.0 (H) 08/30/2022    PSADIAG 2.2 11/16/2017     Bladder Scan PVR: 0 cc     Assessment and Plan:   1. Elevated PSA  --will proceed with prostate MRI, repeat PSA and urine culture.  Patient will follow-up in office to discuss MRI results.  - Ambulatory referral/consult to Urology  - Prostate Specific Antigen, Diagnostic; Future  - POCT URINE DIPSTICK WITHOUT MICROSCOPE  - POCT Bladder Scan  - MRI Prostate W W/O Contrast; Future  - tamsulosin (FLOMAX) 0.4 mg Cap; Take 1 capsule (0.4 mg total) by mouth once daily.  Dispense: 30 capsule; Refill: 11  - Urine Culture High Risk    2. Hesitancy of micturition  --will trial Flomax for nocturia and hesitancy.  - tamsulosin (FLOMAX) 0.4 mg Cap; Take 1 capsule (0.4 mg total) by mouth once daily.  Dispense: 30 capsule; Refill: 11  - Urine Culture High Risk       This note is dictated on M*Modal word recognition program.  There are word recognition mistakes that are occasionally missed on review.

## 2025-05-28 LAB — BACTERIA UR CULT: NO GROWTH

## 2025-07-07 ENCOUNTER — PATIENT MESSAGE (OUTPATIENT)
Dept: UROLOGY | Facility: CLINIC | Age: 65
End: 2025-07-07
Payer: MEDICARE

## 2025-07-08 ENCOUNTER — OFFICE VISIT (OUTPATIENT)
Dept: PRIMARY CARE CLINIC | Facility: CLINIC | Age: 65
End: 2025-07-08
Payer: MEDICARE

## 2025-07-08 VITALS
HEART RATE: 72 BPM | DIASTOLIC BLOOD PRESSURE: 80 MMHG | RESPIRATION RATE: 16 BRPM | SYSTOLIC BLOOD PRESSURE: 134 MMHG | TEMPERATURE: 98 F | HEIGHT: 72 IN | WEIGHT: 203.38 LBS | BODY MASS INDEX: 27.55 KG/M2 | OXYGEN SATURATION: 96 %

## 2025-07-08 DIAGNOSIS — K81.0 ACUTE CHOLECYSTITIS: ICD-10-CM

## 2025-07-08 DIAGNOSIS — I48.0 PAROXYSMAL ATRIAL FIBRILLATION: ICD-10-CM

## 2025-07-08 DIAGNOSIS — R10.84 GENERALIZED ABDOMINAL PAIN: Primary | ICD-10-CM

## 2025-07-08 DIAGNOSIS — R97.20 ELEVATED PSA: ICD-10-CM

## 2025-07-08 DIAGNOSIS — F43.10 POSTTRAUMATIC STRESS DISORDER: ICD-10-CM

## 2025-07-08 DIAGNOSIS — K81.0 ACUTE CHOLECYSTITIS: Primary | ICD-10-CM

## 2025-07-08 DIAGNOSIS — F32.9 REACTIVE DEPRESSION: ICD-10-CM

## 2025-07-08 DIAGNOSIS — E78.00 PURE HYPERCHOLESTEROLEMIA: ICD-10-CM

## 2025-07-08 DIAGNOSIS — E11.36 TYPE 2 DIABETES MELLITUS WITH DIABETIC CATARACT, WITHOUT LONG-TERM CURRENT USE OF INSULIN: ICD-10-CM

## 2025-07-08 DIAGNOSIS — I10 HYPERTENSION, UNSPECIFIED TYPE: ICD-10-CM

## 2025-07-08 PROBLEM — R79.89 ELEVATED LIVER FUNCTION TESTS: Status: RESOLVED | Noted: 2019-03-19 | Resolved: 2025-07-08

## 2025-07-08 PROCEDURE — 99999 PR PBB SHADOW E&M-EST. PATIENT-LVL IV: CPT | Mod: PBBFAC,,, | Performed by: FAMILY MEDICINE

## 2025-07-08 PROCEDURE — 3288F FALL RISK ASSESSMENT DOCD: CPT | Mod: CPTII,S$GLB,, | Performed by: FAMILY MEDICINE

## 2025-07-08 PROCEDURE — 1125F AMNT PAIN NOTED PAIN PRSNT: CPT | Mod: CPTII,S$GLB,, | Performed by: FAMILY MEDICINE

## 2025-07-08 PROCEDURE — 3075F SYST BP GE 130 - 139MM HG: CPT | Mod: CPTII,S$GLB,, | Performed by: FAMILY MEDICINE

## 2025-07-08 PROCEDURE — 3008F BODY MASS INDEX DOCD: CPT | Mod: CPTII,S$GLB,, | Performed by: FAMILY MEDICINE

## 2025-07-08 PROCEDURE — 99214 OFFICE O/P EST MOD 30 MIN: CPT | Mod: S$GLB,,, | Performed by: FAMILY MEDICINE

## 2025-07-08 PROCEDURE — 1101F PT FALLS ASSESS-DOCD LE1/YR: CPT | Mod: CPTII,S$GLB,, | Performed by: FAMILY MEDICINE

## 2025-07-08 PROCEDURE — 3079F DIAST BP 80-89 MM HG: CPT | Mod: CPTII,S$GLB,, | Performed by: FAMILY MEDICINE

## 2025-07-08 PROCEDURE — 1159F MED LIST DOCD IN RCRD: CPT | Mod: CPTII,S$GLB,, | Performed by: FAMILY MEDICINE

## 2025-07-08 PROCEDURE — 3044F HG A1C LEVEL LT 7.0%: CPT | Mod: CPTII,S$GLB,, | Performed by: FAMILY MEDICINE

## 2025-07-08 RX ORDER — METOPROLOL SUCCINATE 100 MG/1
100 TABLET, EXTENDED RELEASE ORAL DAILY
Qty: 90 TABLET | Refills: 3 | Status: SHIPPED | OUTPATIENT
Start: 2025-07-08 | End: 2026-07-08

## 2025-07-08 RX ORDER — METRONIDAZOLE 500 MG/1
500 TABLET ORAL 3 TIMES DAILY
Qty: 21 TABLET | Refills: 0 | Status: ON HOLD | OUTPATIENT
Start: 2025-07-08 | End: 2025-07-11

## 2025-07-08 RX ORDER — SUCRALFATE 1 G/1
TABLET ORAL
Qty: 30 TABLET | Refills: 0 | Status: SHIPPED | OUTPATIENT
Start: 2025-07-08

## 2025-07-08 RX ORDER — CIPROFLOXACIN 500 MG/1
500 TABLET, FILM COATED ORAL 2 TIMES DAILY
Qty: 20 TABLET | Refills: 0 | Status: SHIPPED | OUTPATIENT
Start: 2025-07-08 | End: 2025-07-18

## 2025-07-08 RX ORDER — TRIAMTERENE AND HYDROCHLOROTHIAZIDE 37.5; 25 MG/1; MG/1
1 TABLET ORAL DAILY
Qty: 90 TABLET | Refills: 3 | Status: SHIPPED | OUTPATIENT
Start: 2025-07-08

## 2025-07-08 RX ORDER — NIFEDIPINE 60 MG/1
60 TABLET, EXTENDED RELEASE ORAL DAILY
Qty: 90 TABLET | Refills: 3 | Status: SHIPPED | OUTPATIENT
Start: 2025-07-08

## 2025-07-08 RX ORDER — OMEPRAZOLE 40 MG/1
CAPSULE, DELAYED RELEASE ORAL
Qty: 30 CAPSULE | Refills: 5 | Status: SHIPPED | OUTPATIENT
Start: 2025-07-08

## 2025-07-08 RX ORDER — METRONIDAZOLE 500 MG/1
500 TABLET ORAL EVERY 8 HOURS
Qty: 21 TABLET | Refills: 0 | Status: ON HOLD | OUTPATIENT
Start: 2025-07-08 | End: 2025-07-11

## 2025-07-08 RX ORDER — ONDANSETRON 4 MG/1
4 TABLET, FILM COATED ORAL EVERY 6 HOURS PRN
Qty: 30 TABLET | Refills: 1 | Status: SHIPPED | OUTPATIENT
Start: 2025-07-08

## 2025-07-08 NOTE — PROGRESS NOTES
Subjective:       Patient ID: Everette Hodge Jr. is a 65 y.o. male.    Chief Complaint: Abdominal Pain (And epigastric pain), Anorexia (Loss of appetite), Nausea, Fatigue, and Cyst (Left forearm)      HPI:  65-year-old white male in for epigastric and abdominal pain nausea loss of appetite fatigue sore on left forearm  Abdominal issues times 2-3 weeks---pain occasionally across the upper abdomen from the mid axillary line to the mid axillary line across the costal angle other times across the lower abdomen others across the umbilical area  Have abdominal pain--quality dull aching----severity 6/10---frequency 4-5 times per day---duration occasionally can last all night  Nausea--usually a low-grade nausea were feels like needs to vomit but has vomited a few times----+diarrhea--3-4 to 5 per day----varies between diarrhea and constipation---no ulcers hepatitis gallbladder disease---no melena hematochezia hematemesis  --has problems with the prostate--saw urologist--made appt go to [facility] to get an MRI   Patient was there at 7:00 PM but they stated his name was not on the list.-she is setting up another appointment  Urinary incontinence frequently when finishes urinating thinks it is finished in his not---occasional gets urinary urgency---no hematuria or pyuria---history nephrolithiasis once 30 years ago  No history of musculoskeletal issues or back problems  Lots of fatigue   Hyperlipidemia on Crestor  Type 2 diabetes on Glucophage--glucose has been doing well  Hypertension blood pressure 134/80 on Procardia 60 mg and Maxzide  Atrial fibrillation on Eliquis and Toprol           ROS:  No significant change except for history of present illness  Skin: no psoriasis, eczema, skin cancer  No  Icterus to the skin  HEENT: No headache, ocular pain, blurred vision, diplopia, epistaxis, hoarseness change in voice, thyroid trouble    Lung: No pneumonia, asthma, Tb, wheezing, SOB no smoking  Heart: No chest pain,  ankle edema, palpitations, MI, marco antonio murmur, +hypertension, +hyperlipidemia no stent bypass arrhythmia--see lipids above   Abdomen: No nausea, vomiting,no diarrhea, no constipation, ulcers, hepatitis, gallbladder disease, melena, hematochezia, hematemesis  +elevated liver function test  : no UTI, renal disease, +stones 20 years ago + frequency but drinks 4 liters day   MS: no fractures, O/A, lupus, rheumatoid, gout  Neuro: No dizziness, LOC, seizures   +  diabetes hyperglycemia, no anemia, + anxiety, + depression history of posttraumatic stress--much better only on trazodone to help sleep off of Ativan--see illness partner   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 Trainer at gym   Objective:   Physical Exam:    General: Well nourished, well developed, no acute distress slightly overweight  Skin: No lesions  HEENT: Eyes PERRLA, EOM intact, nose patent, throat non-erythematous ears TM clear   NECK: Supple, no bruits, No JVD, no nodes  Lungs: Clear, no rales, rhonchi, wheezing  Heart: Regular rate and rhythm, no murmurs, gallops, or rubs  Abdomen: flat, bowel sounds positive, no tenderness, or organomegaly--patient has no significant bloating has good bowel sounds no significant tenderness at this time  Rectal no hemorrhoids--minimal increase prostate size right lobe but overall normal--no lesions--nontender well-defined  MS: Range of motion and muscle strength intact  Neuro: Alert, CN intact, oriented X 3  Extremities: No cyanosis, clubbing, or edema         Assessment:       1. Generalized abdominal pain    2. Type 2 diabetes mellitus with diabetic cataract, without long-term current use of insulin    3. Elevated PSA    4. Hypertension, unspecified type    5. Pure hypercholesterolemia    6. Paroxysmal atrial fibrillation    7. Posttraumatic stress disorder    8. Reactive depression              Plan:       Generalized abdominal pain  -     Cancel: CT Abdomen Pelvis  Without Contrast; Future; Expected date: 07/08/2025  -     CT  Abdomen Pelvis  Without Contrast; Future; Expected date: 07/08/2025  -     ondansetron (ZOFRAN) 4 MG tablet; Take 1 tablet (4 mg total) by mouth every 6 (six) hours as needed for Nausea.  Dispense: 30 tablet; Refill: 1    Type 2 diabetes mellitus with diabetic cataract, without long-term current use of insulin    Elevated PSA    Hypertension, unspecified type    Pure hypercholesterolemia    Paroxysmal atrial fibrillation    Posttraumatic stress disorder    Reactive depression    Other orders  -     omeprazole (PRILOSEC) 40 MG capsule; One p.o. q.h.s. do not take with Carafate or Tums  Dispense: 30 capsule; Refill: 5  -     sucralfate (CARAFATE) 1 gram tablet; One p.o. 1 hour a.c. breakfast, 1 hour a.c. lunch, 1 hour a.c. supper  Tums 2 p.o. 1 hour p.c. breakfast two p.o. 1 hour p.c. lunch 2 p.o. 1 hour p.c. supper--Tums should be over-the-counter  Dispense: 30 tablet; Refill: 0            Main Reason follow-up emergency room  Insomnia---Ambien   Decreased libido--wants restart testosterone--has been off of it for about a year--prostate basically normal--has minimal increase in ALT 52--was 66--AST normal  History of atrial fibrillation with rapid ventricular response seen in ER started on Eliquis metoprolol  Hyperlipidemia--started on atorvastatin 40 mg-within normal limits  CPK increase but exercises a lot daily--272 was 483  Elevated liver function tests patient on testosterone--will get acute hepatitis panel/Monospot/abdominal ultrasound see hepatologist may need to get off testosterone  Hyperglycemia--glucose 117 was 165--hemoglobin A1c 6.8 needs to be on metformin 1 p.o. b.i.d.   History BPH on Flomax  Insomnia on trazodone  Anxiety depression markedly improved off of all medications for stress  Lab CBCs CMP hemoglobin A1c CPK free and total testosterone do lab as in computer

## 2025-07-08 NOTE — TELEPHONE ENCOUNTER
Patient notified that antibitocs is being sent in and we are trying to get a hold of general surgery to get him in asap.

## 2025-07-08 NOTE — TELEPHONE ENCOUNTER
No care due was identified.  Health [facility] Embedded Care Due Messages. Reference number: [id number].   7/08/2025 1:45:02 PM CDT

## 2025-07-08 NOTE — TELEPHONE ENCOUNTER
Copied from CRM #[id number]. Topic: General Inquiry - Patient Advice  >> Jul 8, 2025  4:26 PM Aviva wrote:  .1MEDICALADVICE     Patient is calling for Medical Advice regarding: Calling to speak with the nurse regarding his CT Scan today. He states the Radiologist called him and told him he has been trying to get in touch with Dr Allen regarding the abnormal results. He told the patient he needed surgery immediately. Please call him. Thanks.    How long has patient had these symptoms:N/A    Pharmacy name and phone#: N/A    Patient wants a call back or thru [facility], provide patient's call back phone number: N/A    Comments:N/A    Please advise patient replies from provider may take up to 48 hours.

## 2025-07-09 ENCOUNTER — TELEPHONE (OUTPATIENT)
Dept: SURGERY | Facility: CLINIC | Age: 65
End: 2025-07-09
Payer: MEDICARE

## 2025-07-09 DIAGNOSIS — E11.9 TYPE 2 DIABETES MELLITUS WITHOUT COMPLICATION: ICD-10-CM

## 2025-07-09 DIAGNOSIS — K81.0 ACUTE CHOLECYSTITIS: Primary | ICD-10-CM

## 2025-07-09 NOTE — TELEPHONE ENCOUNTER
Refill Routing Note   Medication(s) are not appropriate for processing by [facility] for the following reason(s):        New or recently adjusted medication:Less than 90 days under signature of responsible physician  Outside of protocol    [facility] action(s):  Route  Defer             Appointments  past 12m or future 3m with PCP    Date Provider   Last Visit   7/8/2025 Dorian Allen MD   Next Visit   8/19/2025 Dorian Allen MD   ED visits in past 90 days: 0        Note composed:9:21 PM 07/08/2025

## 2025-07-09 NOTE — TELEPHONE ENCOUNTER
Spoke with pt. Appt scheduled from referral. All questions answered. Pt verbalized understanding.

## 2025-07-09 NOTE — TELEPHONE ENCOUNTER
----- Message from Amena sent at 7/8/2025  4:34 PM CDT -----  Inflammation of the gallbladder with stones  Dr Allen would like patient to be seen ASAP . Please  call patient back to schedule

## 2025-07-10 ENCOUNTER — RESULTS FOLLOW-UP (OUTPATIENT)
Dept: PRIMARY CARE CLINIC | Facility: CLINIC | Age: 65
End: 2025-07-10
Payer: MEDICARE

## 2025-07-10 ENCOUNTER — OFFICE VISIT (OUTPATIENT)
Dept: SURGERY | Facility: CLINIC | Age: 65
End: 2025-07-10
Payer: MEDICARE

## 2025-07-10 VITALS
WEIGHT: 203.5 LBS | SYSTOLIC BLOOD PRESSURE: 112 MMHG | HEART RATE: 78 BPM | DIASTOLIC BLOOD PRESSURE: 73 MMHG | BODY MASS INDEX: 27.56 KG/M2 | HEIGHT: 72 IN

## 2025-07-10 DIAGNOSIS — K80.10 CALCULUS OF GALLBLADDER WITH CHRONIC CHOLECYSTITIS WITHOUT OBSTRUCTION: Primary | ICD-10-CM

## 2025-07-10 DIAGNOSIS — K81.0 ACUTE CHOLECYSTITIS: ICD-10-CM

## 2025-07-10 PROCEDURE — 1159F MED LIST DOCD IN RCRD: CPT | Mod: CPTII,S$GLB,, | Performed by: SURGERY

## 2025-07-10 PROCEDURE — 3074F SYST BP LT 130 MM HG: CPT | Mod: CPTII,S$GLB,, | Performed by: SURGERY

## 2025-07-10 PROCEDURE — 99999 PR PBB SHADOW E&M-EST. PATIENT-LVL V: CPT | Mod: PBBFAC,,, | Performed by: SURGERY

## 2025-07-10 PROCEDURE — 99203 OFFICE O/P NEW LOW 30 MIN: CPT | Mod: S$GLB,,, | Performed by: SURGERY

## 2025-07-10 PROCEDURE — 1125F AMNT PAIN NOTED PAIN PRSNT: CPT | Mod: CPTII,S$GLB,, | Performed by: SURGERY

## 2025-07-10 PROCEDURE — 1160F RVW MEDS BY RX/DR IN RCRD: CPT | Mod: CPTII,S$GLB,, | Performed by: SURGERY

## 2025-07-10 PROCEDURE — 3078F DIAST BP <80 MM HG: CPT | Mod: CPTII,S$GLB,, | Performed by: SURGERY

## 2025-07-10 PROCEDURE — 3008F BODY MASS INDEX DOCD: CPT | Mod: CPTII,S$GLB,, | Performed by: SURGERY

## 2025-07-10 PROCEDURE — 3044F HG A1C LEVEL LT 7.0%: CPT | Mod: CPTII,S$GLB,, | Performed by: SURGERY

## 2025-07-10 RX ORDER — SODIUM CHLORIDE 9 MG/ML
INJECTION, SOLUTION INTRAVENOUS CONTINUOUS
Status: CANCELLED | OUTPATIENT
Start: 2025-07-10

## 2025-07-10 RX ORDER — ENOXAPARIN SODIUM 100 MG/ML
40 INJECTION SUBCUTANEOUS
Status: CANCELLED | OUTPATIENT
Start: 2025-07-10

## 2025-07-10 RX ORDER — INDOCYANINE GREEN AND WATER 25 MG
1.25 KIT INJECTION
Status: CANCELLED | OUTPATIENT
Start: 2025-07-10

## 2025-07-10 NOTE — PROGRESS NOTES
History & Physical    Subjective     History of Present Illness:  Patient is a 65 y.o. male presents with worsening biliary symptoms.    States he is feeling very bloated in fullness in the upper abdomen.    Right upper quadrant discomfort which has been present for at least 2 weeks.    States every time he eats he feels bloated and has nausea.    Had recent imaging study with an ultrasound which showed gallstones and sludge.    No obvious gallbladder wall thickening with the patient's still persists with tenderness.      No chief complaint on file.      Review of patient's allergies indicates:  No Known Allergies    Current Medications[1]    Past Medical History:   Diagnosis Date    Cataract     Depression     Diabetes mellitus     Diabetes mellitus, type 2     Hypertension     PTSD (post-traumatic stress disorder)      History reviewed. No pertinent surgical history.  Family History   Problem Relation Name Age of Onset    Cancer Mother      Cancer Father      Diabetes Father      Diabetes Sister       Social History[2]     Review of Systems:  Review of Systems   Constitutional:  Negative for appetite change, fatigue, fever and unexpected weight change.   HENT:  Negative for sore throat and trouble swallowing.    Eyes: Negative.    Respiratory:  Negative for cough, shortness of breath and wheezing.    Cardiovascular:  Negative for chest pain and leg swelling.   Gastrointestinal:  Positive for abdominal pain and nausea. Negative for abdominal distention, blood in stool, constipation, diarrhea and vomiting.   Endocrine: Negative.    Genitourinary: Negative.    Musculoskeletal:  Negative for back pain.   Skin: Negative.  Negative for rash.   Allergic/Immunologic: Negative.    Neurological: Negative.    Hematological: Negative.    Psychiatric/Behavioral:  Negative for confusion.         Objective     Vital Signs (Most Recent)  Pulse: 78 (07/10/25 1512)  BP: 112/73 (07/10/25 1512)  6' (1.829 m)  92.3 kg (203 lb 7.8 oz)      Physical Exam:  Physical Exam  Vitals and nursing note reviewed.   Constitutional:       Appearance: He is well-developed.   HENT:      Head: Normocephalic and atraumatic.   Cardiovascular:      Rate and Rhythm: Normal rate.      Heart sounds: Normal heart sounds.   Pulmonary:      Effort: Pulmonary effort is normal.   Abdominal:      General: Bowel sounds are normal. There is no distension.      Palpations: Abdomen is soft.      Tenderness: There is abdominal tenderness (Especially right upper quadrant).   Musculoskeletal:         General: Normal range of motion.      Cervical back: Normal range of motion.   Skin:     General: Skin is warm and dry.      Capillary Refill: Capillary refill takes less than 2 seconds.   Neurological:      Mental Status: He is alert and oriented to person, place, and time.   Psychiatric:         Behavior: Behavior normal.     Laboratory  CBC: Reviewed  CMP: Reviewed    Diagnostic Results:  US: Reviewed  Gallstones and sludge     Assessment and Plan   65-year-old male with gallstones and sludge, chronic cholecystitis at this point    PLAN:    Recommend surgical intervention soon as possible      I described the nature of the patient's pathology and the spectrum of disease presentation ranging from mild discomfort to acute cholecystitis or gallstone/necrotizing pancreatitis. Non-operative therapy was discussed, but the patient would require a strict non-fat diet and still this would not guarantee resolution of symptoms. I think surgery is in the patient's best interest given the severity of symptoms, and he is agreeable. I described the risks of the surgery including but not limited to bleeding, infection, wound complications, injury to local structures including the common bile duct, bile leak, persistent abd pain, and potential need for further interventions. The patient demonstrated understanding of these risks and asked appropriate questions. A consent form was signed today, and  the patient will be booked for surgery as laparoscopic cholecystectomy, possible open, possible intraoperative cholangiogram.              [1]   Current Outpatient Medications   Medication Sig Dispense Refill    ascorbic acid, vitamin C, (VITAMIN C) 1000 MG tablet Take 2,000 mg by mouth once daily.      ciprofloxacin HCl (CIPRO) 500 MG tablet Take 1 tablet (500 mg total) by mouth 2 (two) times daily. for 10 days 20 tablet 0    ciprofloxacin HCl (CIPRO) 500 MG tablet Take 1 tablet (500 mg total) by mouth 2 (two) times daily. One p.o. b.i.d. times 10 days for 10 days 20 tablet 0    ELIQUIS 5 mg Tab Take 1 tablet (5 mg total) by mouth 2 (two) times daily. 180 tablet 3    metFORMIN (GLUCOPHAGE) 500 MG tablet Take 1 tablet (500 mg total) by mouth 2 (two) times daily with meals. 180 tablet 3    metoprolol succinate (TOPROL-XL) 100 MG 24 hr tablet TAKE 1 TABLET (100 MG TOTAL) BY MOUTH ONCE DAILY. 90 tablet 3    metroNIDAZOLE (FLAGYL) 500 MG tablet Take 1 tablet (500 mg total) by mouth 3 (three) times daily. X7 days do not take alcohol while on this drug 21 tablet 0    metroNIDAZOLE (FLAGYL) 500 MG tablet Take 1 tablet (500 mg total) by mouth every 8 (eight) hours. 21 tablet 0    multivitamin (THERAGRAN) per tablet Take 1 tablet by mouth once daily.      NIFEdipine (PROCARDIA-XL) 60 MG (OSM) 24 hr tablet TAKE 1 TABLET (60 MG TOTAL) BY MOUTH ONCE DAILY. 90 tablet 3    omeprazole (PRILOSEC) 40 MG capsule One p.o. q.h.s. do not take with Carafate or Tums 30 capsule 5    ondansetron (ZOFRAN) 4 MG tablet Take 1 tablet (4 mg total) by mouth every 6 (six) hours as needed for Nausea. 30 tablet 1    rosuvastatin (CRESTOR) 5 MG tablet Take 1 tablet (5 mg total) by mouth once daily. 90 tablet 3    sucralfate (CARAFATE) 1 gram tablet One p.o. 1 hour a.c. breakfast, 1 hour a.c. lunch, 1 hour a.c. supper  Tums 2 p.o. 1 hour p.c. breakfast two p.o. 1 hour p.c. lunch 2 p.o. 1 hour p.c. supper--Tums should be over-the-counter 30 tablet 0     tamsulosin (FLOMAX) 0.4 mg Cap Take 1 capsule (0.4 mg total) by mouth once daily. 30 capsule 11    traZODone (DESYREL) 150 MG tablet Take 1 tablet (150 mg total) by mouth every evening. 90 tablet 3    triamterene-hydrochlorothiazide 37.5-25 mg (MAXZIDE-25) 37.5-25 mg per tablet TAKE 1 TABLET BY MOUTH ONCE DAILY. 90 tablet 3     No current facility-administered medications for this visit.   [2]   Social History  Tobacco Use    Smoking status: Former     Types: Cigarettes    Smokeless tobacco: Never   Substance Use Topics    Alcohol use: No    Drug use: No

## 2025-07-11 ENCOUNTER — RESULTS FOLLOW-UP (OUTPATIENT)
Dept: PRIMARY CARE CLINIC | Facility: CLINIC | Age: 65
End: 2025-07-11
Payer: MEDICARE

## 2025-07-11 PROCEDURE — 82570 ASSAY OF URINE CREATININE: CPT | Performed by: FAMILY MEDICINE

## 2025-07-18 ENCOUNTER — PATIENT OUTREACH (OUTPATIENT)
Dept: ADMINISTRATIVE | Facility: HOSPITAL | Age: 65
End: 2025-07-18
Payer: MEDICARE

## 2025-07-18 DIAGNOSIS — E11.9 TYPE 2 DIABETES MELLITUS WITHOUT COMPLICATION, WITHOUT LONG-TERM CURRENT USE OF INSULIN: ICD-10-CM

## 2025-07-18 DIAGNOSIS — Z12.12 ENCOUNTER FOR COLORECTAL CANCER SCREENING: Primary | ICD-10-CM

## 2025-07-18 DIAGNOSIS — Z12.11 ENCOUNTER FOR COLORECTAL CANCER SCREENING: Primary | ICD-10-CM

## 2025-07-18 NOTE — PROGRESS NOTES
Care Coordination Encounter Details:       MyChart Portal Status:         [x]  Reviewed MyChart Portal Status offered / enrolled if applicable        Additional Notes:     MyChart Outcomes: Pt is enrolled & active          Updates Requested / Reviewed:        Updated Care Coordination Note, Care Everywhere, , and Immunizations Reconciliation Completed or Queried: [place]         Health Maintenance Screening(s) Due:      Health Maintenance Topics Overdue:      VBHM Score: 4     Colon Cancer Screening  Eye Exam  Foot Exam  AAA Screening    RSV Vaccine            Health Maintenance Topic(s) Outreach Outcomes & Actions Taken:    Colorectal Cancer Screening - Outreach Outcomes & Actions Taken  : Colonoscopy Case Request / Referral / Home Test Order Placed. Last colonoscopy noted that patient had done in 2015, but no report attached. New order placed as it has been 10 years. Staff message sent to gastro support in regards to scheduling     Eye Exam - Outreach Outcomes & Actions Taken  : Eye Exam Screening Order Placed. No known HX of diabetic retinopathy. Order placed for diabetic eye photo screening     Primary Care Appt - Outreach Outcomes & Actions Taken  : Primary Care Appt Scheduled. Patient has a F/U scheduled with PCP on 7/21/2025        Chronic Disease Management:     Diabetes Measures        Lab Results   Component Value Date    HGBA1C 6.0 (H) 05/07/2025           [x]  Reviewed chart for active Diabetes diagnosis     []  Scheduled necessary follow up appointments if needed         Additional Notes:  Stable            Hypertension Measures        BP Readings from Last 1 Encounters:   07/11/25 (!) 154/89           [x]  Reviewed chart for active Hypertension diagnosis     []  Reviewed & documented Home BP Cuff     []  Documented a Remote BP if needed & applicable     []  Scheduled necessary follow up appointments with Primary Care if needed         Additional Notes:  Patient has a F/U with PCP on  7/21/2025           Provider Team Continuity:     Last PCP Visit Date: 7/8/2025          [x]  Reviewed Primary Care Provider Visits, Annual Wellness Visit, and Future          Appointments to ensure appointments have been scheduled and/or           completed          Social Determinants of Health          [x]  Reviewed, completed, and/or updated the following sections:                  Food Insecurity, Transportation Needs, Financial Resource Strain,                 Tobacco Use        Additional Notes:             Care Management, Digital Medicine, and/or Education Referrals    OPCM Risk Score: 37.1         Next Steps - Referral Actions:

## 2025-07-21 ENCOUNTER — OFFICE VISIT (OUTPATIENT)
Dept: PRIMARY CARE CLINIC | Facility: CLINIC | Age: 65
End: 2025-07-21
Payer: MEDICARE

## 2025-07-21 ENCOUNTER — CLINICAL SUPPORT (OUTPATIENT)
Dept: PRIMARY CARE CLINIC | Facility: CLINIC | Age: 65
End: 2025-07-21
Attending: FAMILY MEDICINE
Payer: MEDICARE

## 2025-07-21 VITALS
SYSTOLIC BLOOD PRESSURE: 138 MMHG | BODY MASS INDEX: 28.45 KG/M2 | DIASTOLIC BLOOD PRESSURE: 78 MMHG | OXYGEN SATURATION: 96 % | HEIGHT: 70 IN | TEMPERATURE: 98 F | HEART RATE: 88 BPM | WEIGHT: 198.75 LBS

## 2025-07-21 DIAGNOSIS — Z90.49 HISTORY OF CHOLECYSTECTOMY: Primary | ICD-10-CM

## 2025-07-21 DIAGNOSIS — I48.0 PAROXYSMAL ATRIAL FIBRILLATION: ICD-10-CM

## 2025-07-21 DIAGNOSIS — F43.10 POSTTRAUMATIC STRESS DISORDER: ICD-10-CM

## 2025-07-21 DIAGNOSIS — I10 HYPERTENSION, UNSPECIFIED TYPE: ICD-10-CM

## 2025-07-21 DIAGNOSIS — E11.9 TYPE 2 DIABETES MELLITUS WITHOUT COMPLICATION, WITHOUT LONG-TERM CURRENT USE OF INSULIN: ICD-10-CM

## 2025-07-21 DIAGNOSIS — R10.84 GENERALIZED ABDOMINAL PAIN: ICD-10-CM

## 2025-07-21 DIAGNOSIS — E78.00 PURE HYPERCHOLESTEROLEMIA: ICD-10-CM

## 2025-07-21 DIAGNOSIS — E11.36 TYPE 2 DIABETES MELLITUS WITH DIABETIC CATARACT, WITHOUT LONG-TERM CURRENT USE OF INSULIN: ICD-10-CM

## 2025-07-21 DIAGNOSIS — Z79.890 LONG-TERM CURRENT USE OF TESTOSTERONE CYPIONATE: ICD-10-CM

## 2025-07-21 DIAGNOSIS — R73.03 PREDIABETES: ICD-10-CM

## 2025-07-21 DIAGNOSIS — L98.9 SKIN LESION OF LEFT ARM: ICD-10-CM

## 2025-07-21 DIAGNOSIS — K64.9 HEMORRHOIDS, UNSPECIFIED HEMORRHOID TYPE: ICD-10-CM

## 2025-07-21 DIAGNOSIS — R19.7 DIARRHEA, UNSPECIFIED TYPE: ICD-10-CM

## 2025-07-21 DIAGNOSIS — R11.0 NAUSEA: ICD-10-CM

## 2025-07-21 PROCEDURE — 92228 IMG RTA DETC/MNTR DS PHY/QHP: CPT | Mod: 26,S$GLB,, | Performed by: OPTOMETRIST

## 2025-07-21 PROCEDURE — 1101F PT FALLS ASSESS-DOCD LE1/YR: CPT | Mod: CPTII,S$GLB,, | Performed by: FAMILY MEDICINE

## 2025-07-21 PROCEDURE — 3044F HG A1C LEVEL LT 7.0%: CPT | Mod: CPTII,S$GLB,, | Performed by: FAMILY MEDICINE

## 2025-07-21 PROCEDURE — 1126F AMNT PAIN NOTED NONE PRSNT: CPT | Mod: CPTII,S$GLB,, | Performed by: FAMILY MEDICINE

## 2025-07-21 PROCEDURE — 3066F NEPHROPATHY DOC TX: CPT | Mod: CPTII,S$GLB,, | Performed by: FAMILY MEDICINE

## 2025-07-21 PROCEDURE — 3288F FALL RISK ASSESSMENT DOCD: CPT | Mod: CPTII,S$GLB,, | Performed by: FAMILY MEDICINE

## 2025-07-21 PROCEDURE — 3075F SYST BP GE 130 - 139MM HG: CPT | Mod: CPTII,S$GLB,, | Performed by: FAMILY MEDICINE

## 2025-07-21 PROCEDURE — 2025F 7 FLD RTA PHOTO W/O RTNOPTHY: CPT | Mod: CPTII,S$GLB,, | Performed by: OPTOMETRIST

## 2025-07-21 PROCEDURE — 1159F MED LIST DOCD IN RCRD: CPT | Mod: CPTII,S$GLB,, | Performed by: FAMILY MEDICINE

## 2025-07-21 PROCEDURE — 99999 PR PBB SHADOW E&M-EST. PATIENT-LVL IV: CPT | Mod: PBBFAC,,, | Performed by: FAMILY MEDICINE

## 2025-07-21 PROCEDURE — 96372 THER/PROPH/DIAG INJ SC/IM: CPT | Mod: S$GLB,,, | Performed by: FAMILY MEDICINE

## 2025-07-21 PROCEDURE — 3060F POS MICROALBUMINURIA REV: CPT | Mod: CPTII,S$GLB,, | Performed by: FAMILY MEDICINE

## 2025-07-21 PROCEDURE — 3078F DIAST BP <80 MM HG: CPT | Mod: CPTII,S$GLB,, | Performed by: FAMILY MEDICINE

## 2025-07-21 PROCEDURE — 99214 OFFICE O/P EST MOD 30 MIN: CPT | Mod: 25,S$GLB,, | Performed by: FAMILY MEDICINE

## 2025-07-21 PROCEDURE — 3008F BODY MASS INDEX DOCD: CPT | Mod: CPTII,S$GLB,, | Performed by: FAMILY MEDICINE

## 2025-07-21 RX ORDER — CYANOCOBALAMIN 1000 UG/ML
1000 INJECTION, SOLUTION INTRAMUSCULAR; SUBCUTANEOUS ONCE
Status: COMPLETED | OUTPATIENT
Start: 2025-07-21 | End: 2025-07-21

## 2025-07-21 RX ORDER — ONDANSETRON 4 MG/1
4 TABLET, FILM COATED ORAL EVERY 6 HOURS PRN
Qty: 30 TABLET | Refills: 1 | Status: SHIPPED | OUTPATIENT
Start: 2025-07-21

## 2025-07-21 RX ORDER — HYDROCORTISONE ACETATE 25 MG/1
SUPPOSITORY RECTAL
Qty: 12 SUPPOSITORY | Refills: 1 | Status: SHIPPED | OUTPATIENT
Start: 2025-07-21

## 2025-07-21 RX ADMIN — CYANOCOBALAMIN 1000 MCG: 1000 INJECTION, SOLUTION INTRAMUSCULAR; SUBCUTANEOUS at 12:07

## 2025-07-21 NOTE — PROGRESS NOTES
Verified pt ID using name and . NKDA. Administered B12 in right deltoid per physician order using aseptic technique. Aspirated and no blood return noted. Pt tolerated well with no adverse reactions noted.

## 2025-07-21 NOTE — PROGRESS NOTES
Everette GARDNER Naveen Mcghee is a 65 y.o. male here for a diabetic eye screening with non-dilated fundus photos per Dr. Allen.    Patient cooperative?: Yes  Small pupils?: No  Last eye exam: greater than 1 year    For exam results, see Encounter Report.

## 2025-07-21 NOTE — PROGRESS NOTES
Subjective:       Patient ID: Everette Hodge Jr. is a 65 y.o. male.    Chief Complaint: Follow-up      HPI:  65-year-old white male had cholecystectomy 07/11/2025 for acute cholecystitis with cholelithiasis--had robotic laparoscopic procedure was discharged that day  ---hx diarrhea since --has hemorrhoid size walnut killing him.  Patient was told he could eat anything wants to.   Nausea does not make patient vomit very low grade--has 4-5 loose bowel movements per day.   Weight loss not sure how much --no energy always fatigued .   Patient concerned because his partner has renal issues he is trying to take care of him but feels too weak to do that         Office visit 07/08/2025 65-year-old white male in for epigastric and abdominal pain nausea loss of appetite fatigue sore on left forearm  Abdominal issues times 2-3 weeks---pain occasionally across the upper abdomen from the mid axillary line to the mid axillary line across the costal angle other times across the lower abdomen others across the umbilical area  Have abdominal pain--quality dull aching----severity 6/10---frequency 4-5 times per day---duration occasionally can last all night  Nausea--usually a low-grade nausea were feels like needs to vomit but has vomited a few times----+diarrhea--3-4 to 5 per day----varies between diarrhea and constipation---no ulcers hepatitis gallbladder disease---no melena hematochezia hematemesis  --has problems with the prostate--saw urologist--made appt go to [facility] to get an MRI   Patient was there at 7:00 PM but they stated his name was not on the list.-she is setting up another appointment  Urinary incontinence frequently when finishes urinating thinks it is finished in his not---occasional gets urinary urgency---no hematuria or pyuria---history nephrolithiasis once 30 years ago  No history of musculoskeletal issues or back problems  Lots of fatigue   Hyperlipidemia on Crestor  Type 2 diabetes on  Glucophage--glucose has been doing well  Hypertension blood pressure 134/80 on Procardia 60 mg and Maxzide  Atrial fibrillation on Eliquis and Toprol           ROS:  No significant change except for history of present illness  Skin: no psoriasis, eczema, skin cancer  No  Icterus to the skin  HEENT: No headache, ocular pain, blurred vision, diplopia, epistaxis, hoarseness change in voice, thyroid trouble    Lung: No pneumonia, asthma, Tb, wheezing, SOB no smoking  Heart: No chest pain, ankle edema, palpitations, MI, marco antonio murmur, +hypertension, +hyperlipidemia no stent bypass arrhythmia--see lipids above   Abdomen: No nausea, vomiting,no diarrhea, no constipation, ulcers, hepatitis, gallbladder disease, melena, hematochezia, hematemesis  +elevated liver function test  : no UTI, renal disease, +stones 20 years ago + frequency but drinks 4 liters day   MS: no fractures, O/A, lupus, rheumatoid, gout  Neuro: No dizziness, LOC, seizures   +  diabetes hyperglycemia, no anemia, + anxiety, + depression history of posttraumatic stress--much better only on trazodone to help sleep off of Ativan--see illness partner   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 Trainer at gym   Objective:   Physical Exam:    General: Well nourished, well developed, no acute distress slightly overweight  Skin: No lesions  HEENT: Eyes PERRLA, EOM intact, nose patent, throat non-erythematous ears TM clear   NECK: Supple, no bruits, No JVD, no nodes  Lungs: Clear, no rales, rhonchi, wheezing  Heart: Regular rate and rhythm, no murmurs, gallops, or rubs  Abdomen: flat, bowel sounds positive, no tenderness, or organomegaly--BS normal   Rectal --thrombosed hemorrhoids spontaneously drained  MS: Ra  nge of motion and muscle strength intact  Neuro: Alert, CN intact, oriented X 3  Extremities: No cyanosis, clubbing, or edema         Assessment:       1. History of cholecystectomy    2. Hemorrhoids, unspecified hemorrhoid type    3. Nausea    4. Diarrhea, unspecified  type    5. Hypertension, unspecified type    6. Pure hypercholesterolemia    7. Long-term current use of testosterone cypionate    8. Paroxysmal atrial fibrillation    9. Posttraumatic stress disorder    10. Prediabetes    11. Type 2 diabetes mellitus with diabetic cataract, without long-term current use of insulin    12. Generalized abdominal pain    13. Skin lesion of left arm                Plan:       History of cholecystectomy  -     CBC Auto Differential; Future; Expected date: 07/21/2025  -     Comprehensive Metabolic Panel; Future; Expected date: 07/21/2025  -     LIPASE; Future; Expected date: 07/21/2025    Hemorrhoids, unspecified hemorrhoid type    Nausea    Diarrhea, unspecified type  -     Clostridioides difficile EIA; Future; Expected date: 07/21/2025    Hypertension, unspecified type  -     CBC Auto Differential; Future; Expected date: 07/21/2025    Pure hypercholesterolemia    Long-term current use of testosterone cypionate    Paroxysmal atrial fibrillation    Posttraumatic stress disorder    Prediabetes    Type 2 diabetes mellitus with diabetic cataract, without long-term current use of insulin    Generalized abdominal pain  -     ondansetron (ZOFRAN) 4 MG tablet; Take 1 tablet (4 mg total) by mouth every 6 (six) hours as needed for Nausea.  Dispense: 30 tablet; Refill: 1    Skin lesion of left arm  -     Ambulatory referral/consult to Dermatology; Future; Expected date: 07/28/2025    Other orders  -     cyanocobalamin injection 1,000 mcg  -     hydrocortisone (ANUSOL-HC) 25 mg suppository; One per rectum b.i.d. x6 days may repeat as needed has a refills  Dispense: 12 suppository; Refill: 1              Main Reason follow-up emergency room  History cholecystectomy 07/11/2025--Dr Parmar--robotic laparoscopic cholecystectomy for acute cholecystitis and cholelithiasis---nausea weight loss loose bowel movements---told needs to be on low-fat diet---Zofran for nausea--Imodium for diarrhea---discuss  Chan with Dr. Palmer has appt matt not sure when   Hemorrhoids---has a thrombosed hemorrhoid that is spontaneously drain--Anusol HC suppositories when per rectum b.i.d. x6 days  Chronic fatigue vitamin B12 injection now in q.week times 10 take multivitamin daily  Lab CBCs CMP lipase--stool C difficile  Skin lesion left forearm--may be a verrucous lesion but needs biopsy or could remove it  Other medical issues  Insomnia---Ambien   Decreased libido--wants restart testosterone--has been off of it for about a year--prostate basically normal--has minimal increase in ALT 52--was 66--AST normal  History of atrial fibrillation with rapid ventricular response seen in ER started on Eliquis metoprolol  Hyperlipidemia--started on atorvastatin 40 mg-within normal limits  CPK increase but exercises a lot daily--272 was 483  Elevated liver function tests patient on testosterone--will get acute hepatitis panel/Monospot/abdominal ultrasound see hepatologist may need to get off testosterone  Hyperglycemia--glucose 117 was 165--hemoglobin A1c 6.8 needs to be on metformin 1 p.o. b.i.d.   History BPH on Flomax  Insomnia on trazodone  Anxiety depression markedly improved off of all medications for stress

## 2025-07-28 ENCOUNTER — OFFICE VISIT (OUTPATIENT)
Dept: SURGERY | Facility: CLINIC | Age: 65
End: 2025-07-28
Payer: MEDICARE

## 2025-07-28 ENCOUNTER — CLINICAL SUPPORT (OUTPATIENT)
Dept: PRIMARY CARE CLINIC | Facility: CLINIC | Age: 65
End: 2025-07-28
Payer: MEDICARE

## 2025-07-28 VITALS
BODY MASS INDEX: 28.23 KG/M2 | DIASTOLIC BLOOD PRESSURE: 78 MMHG | HEART RATE: 78 BPM | WEIGHT: 196.75 LBS | SYSTOLIC BLOOD PRESSURE: 124 MMHG

## 2025-07-28 DIAGNOSIS — Z98.890 POST-OPERATIVE STATE: Primary | ICD-10-CM

## 2025-07-28 DIAGNOSIS — R53.82 CHRONIC FATIGUE: Primary | ICD-10-CM

## 2025-07-28 PROCEDURE — 99999 PR PBB SHADOW E&M-EST. PATIENT-LVL III: CPT | Mod: PBBFAC,,, | Performed by: SURGERY

## 2025-07-28 PROCEDURE — 96372 THER/PROPH/DIAG INJ SC/IM: CPT | Mod: S$GLB,,, | Performed by: FAMILY MEDICINE

## 2025-07-28 RX ORDER — CYANOCOBALAMIN 1000 UG/ML
1000 INJECTION, SOLUTION INTRAMUSCULAR; SUBCUTANEOUS WEEKLY
Status: SHIPPED | OUTPATIENT
Start: 2025-07-28 | End: 2025-10-06

## 2025-07-28 RX ADMIN — CYANOCOBALAMIN 1000 MCG: 1000 INJECTION, SOLUTION INTRAMUSCULAR; SUBCUTANEOUS at 10:07

## 2025-07-28 NOTE — PROGRESS NOTES
Verified pt ID using name and . Lauren Reyes, LPN. Administered B 12 in left deltoid per physician order using aseptic technique. Pt tolerated well with no adverse reactions noted.

## 2025-08-04 ENCOUNTER — RESULTS FOLLOW-UP (OUTPATIENT)
Dept: PRIMARY CARE CLINIC | Facility: CLINIC | Age: 65
End: 2025-08-04

## 2025-08-04 ENCOUNTER — CLINICAL SUPPORT (OUTPATIENT)
Dept: PRIMARY CARE CLINIC | Facility: CLINIC | Age: 65
End: 2025-08-04
Payer: MEDICARE

## 2025-08-04 DIAGNOSIS — R53.82 CHRONIC FATIGUE: Primary | ICD-10-CM

## 2025-08-04 PROCEDURE — 96372 THER/PROPH/DIAG INJ SC/IM: CPT | Mod: S$GLB,,, | Performed by: FAMILY MEDICINE

## 2025-08-04 RX ADMIN — CYANOCOBALAMIN 1000 MCG: 1000 INJECTION, SOLUTION INTRAMUSCULAR; SUBCUTANEOUS at 10:08

## 2025-08-04 NOTE — PROGRESS NOTES
Verified pt ID using name and . NKDA. Administered B12 1,000 mcg in left deltoid per physician order using aseptic technique. Aspirated and no blood return noted. Pt tolerated well with no adverse reactions noted.

## 2025-08-06 ENCOUNTER — TELEPHONE (OUTPATIENT)
Dept: GASTROENTEROLOGY | Facility: CLINIC | Age: 65
End: 2025-08-06
Payer: MEDICARE

## 2025-08-06 NOTE — PROGRESS NOTES
Pathology from recent colonoscopy reviewed.  Colon polyp(s) benign.  Repeat colonoscopy in 1 year.

## 2025-08-06 NOTE — TELEPHONE ENCOUNTER
Gave patient his results and he gave a v/u    ----- Message from Tyler Vaughn MD sent at 8/6/2025  9:43 AM CDT -----  Pathology from recent colonoscopy reviewed.  Colon polyp(s) benign.  Repeat colonoscopy in 1 year.  ----- Message -----  From: Lab, Background User  Sent: 8/6/2025   8:50 AM CDT  To: Tyler Vaughn MD

## 2025-08-11 ENCOUNTER — TELEPHONE (OUTPATIENT)
Dept: PRIMARY CARE CLINIC | Facility: CLINIC | Age: 65
End: 2025-08-11
Payer: MEDICARE

## 2025-08-18 ENCOUNTER — CLINICAL SUPPORT (OUTPATIENT)
Dept: PRIMARY CARE CLINIC | Facility: CLINIC | Age: 65
End: 2025-08-18
Payer: MEDICARE

## 2025-08-18 DIAGNOSIS — E53.8 B12 DEFICIENCY: Primary | ICD-10-CM

## 2025-08-18 PROCEDURE — 96372 THER/PROPH/DIAG INJ SC/IM: CPT | Mod: S$GLB,,, | Performed by: FAMILY MEDICINE

## 2025-08-18 RX ADMIN — CYANOCOBALAMIN 1000 MCG: 1000 INJECTION, SOLUTION INTRAMUSCULAR; SUBCUTANEOUS at 02:08
